# Patient Record
Sex: FEMALE | Race: BLACK OR AFRICAN AMERICAN | NOT HISPANIC OR LATINO | Employment: FULL TIME | URBAN - METROPOLITAN AREA
[De-identification: names, ages, dates, MRNs, and addresses within clinical notes are randomized per-mention and may not be internally consistent; named-entity substitution may affect disease eponyms.]

---

## 2017-01-12 ENCOUNTER — PATIENT MESSAGE (OUTPATIENT)
Dept: RHEUMATOLOGY | Facility: CLINIC | Age: 32
End: 2017-01-12

## 2017-01-12 ENCOUNTER — LAB VISIT (OUTPATIENT)
Dept: LAB | Facility: HOSPITAL | Age: 32
End: 2017-01-12
Attending: INTERNAL MEDICINE
Payer: COMMERCIAL

## 2017-01-12 DIAGNOSIS — N18.30 CHRONIC KIDNEY DISEASE (CKD), STAGE III (MODERATE): ICD-10-CM

## 2017-01-12 DIAGNOSIS — M32.9 SYSTEMIC LUPUS ERYTHEMATOSUS ARTHRITIS: ICD-10-CM

## 2017-01-12 DIAGNOSIS — M32.14 STAGE IV LUPUS NEPHRITIS (WHO): ICD-10-CM

## 2017-01-12 LAB
BACTERIA #/AREA URNS HPF: ABNORMAL /HPF
BILIRUB UR QL STRIP: NEGATIVE
CLARITY UR: CLEAR
COLOR UR: YELLOW
GLUCOSE UR QL STRIP: NEGATIVE
HGB UR QL STRIP: ABNORMAL
HYALINE CASTS #/AREA URNS LPF: 0 /LPF
KETONES UR QL STRIP: NEGATIVE
LEUKOCYTE ESTERASE UR QL STRIP: NEGATIVE
MICROSCOPIC COMMENT: ABNORMAL
NITRITE UR QL STRIP: NEGATIVE
PH UR STRIP: 6 [PH] (ref 5–8)
PROT UR QL STRIP: ABNORMAL
RBC #/AREA URNS HPF: 20 /HPF (ref 0–4)
SP GR UR STRIP: 1.02 (ref 1–1.03)
URN SPEC COLLECT METH UR: ABNORMAL
WBC #/AREA URNS HPF: 2 /HPF (ref 0–5)

## 2017-01-12 PROCEDURE — 84156 ASSAY OF PROTEIN URINE: CPT

## 2017-01-12 PROCEDURE — 81000 URINALYSIS NONAUTO W/SCOPE: CPT | Mod: PO

## 2017-01-13 LAB
CREAT UR-MCNC: 100 MG/DL
PROT UR-MCNC: 58 MG/DL
PROT/CREAT RATIO, UR: 0.58

## 2017-01-13 RX ORDER — HYDROXYCHLOROQUINE SULFATE 200 MG/1
200 TABLET, FILM COATED ORAL 2 TIMES DAILY
Qty: 180 TABLET | Refills: 3 | Status: SHIPPED | OUTPATIENT
Start: 2017-01-13 | End: 2017-09-06 | Stop reason: SDUPTHER

## 2017-01-16 ENCOUNTER — OFFICE VISIT (OUTPATIENT)
Dept: NEPHROLOGY | Facility: CLINIC | Age: 32
End: 2017-01-16
Payer: COMMERCIAL

## 2017-01-16 VITALS
BODY MASS INDEX: 47.66 KG/M2 | SYSTOLIC BLOOD PRESSURE: 124 MMHG | WEIGHT: 242.75 LBS | DIASTOLIC BLOOD PRESSURE: 86 MMHG | HEIGHT: 60 IN

## 2017-01-16 DIAGNOSIS — R31.9 HEMATURIA SYNDROME: ICD-10-CM

## 2017-01-16 DIAGNOSIS — M32.14 LUPUS NEPHRITIS: Primary | ICD-10-CM

## 2017-01-16 PROCEDURE — 3079F DIAST BP 80-89 MM HG: CPT | Mod: S$GLB,,, | Performed by: INTERNAL MEDICINE

## 2017-01-16 PROCEDURE — 3074F SYST BP LT 130 MM HG: CPT | Mod: S$GLB,,, | Performed by: INTERNAL MEDICINE

## 2017-01-16 PROCEDURE — 99999 PR PBB SHADOW E&M-EST. PATIENT-LVL III: CPT | Mod: PBBFAC,,, | Performed by: INTERNAL MEDICINE

## 2017-01-16 PROCEDURE — 1159F MED LIST DOCD IN RCRD: CPT | Mod: S$GLB,,, | Performed by: INTERNAL MEDICINE

## 2017-01-16 PROCEDURE — 99214 OFFICE O/P EST MOD 30 MIN: CPT | Mod: S$GLB,,, | Performed by: INTERNAL MEDICINE

## 2017-01-16 NOTE — MR AVS SNAPSHOT
[facility] Nephrology  [address] [place] [address]  Phone: [phone]  Fax: [phone]                  Marsha Marina   2017 11:30 AM   Office Visit    Description:  Female : 1985   Provider:  Hussain Avery MD   Department:  [facility] - Nephrology           Reason for Visit     Chronic Kidney Disease     Follow-up                To Do List           Future Appointments        Provider Department Dept Phone    2017 10:10 AM LAB[facility]ATORY, [facility] Laboratory [phone]    2017 10:20 AM SPECIMEN, [facility] - Specimen Laboratory [phone]    2017 10:10 AM LAB[facility]ORY, [facility] Laboratory [phone]    2017 10:20 AM SPE[facility]IMEN, [facility] - Specimen Laboratory [phone]    2017 10:10 AM LAB[facility]ATORY, [facility] [phone]      Goals (5 Years of Data)     None      Follow-Up and Disposition     Return in about 3 months (around 2017).      [facility] On Call     [facility] On Call Nurse Care Line - 24 Assistance  Registered nurses in the [facility] On Call Center provide clinical advisement, health education, appointment booking, and other advisory services.  Call for this free service at [phone].             Medications           Message regarding Medications     Verify the changes and/or additions to your medication regime listed below are the same as discussed with your clinician today.  If any of these changes or additions are incorrect, please notify your healthcare provider.             Verify that the below list of medications is an accurate representation of the medications you are currently taking.  If none reported, the list may be blank. If incorrect, please contact your healthcare provider. Carry this list with you in case of emergency.           Current Medications     diltiaZEM (CARDIZEM CD) 360 MG 24 hr capsule Take 1 capsule (360 mg total) by mouth once daily.    efinaconazole 10 % Tere  Apply 1 application topically once daily.    ergocalciferol (ERGOCALCIFEROL) 50,000 unit Cap Take 1 capsule (50,000 Units total) by mouth every 7 days.    furosemide (LASIX) 40 MG tablet Take 1 tablet (40 mg total) by mouth 2 (two) times daily.    hydrALAZINE (APRESOLINE) 50 MG tablet Take 1 tablet (50 mg total) by mouth every 12 (twelve) hours.    hydroxychloroquine (PLAQUENIL) 200 mg tablet Take 1 tablet (200 mg total) by mouth 2 (two) times daily.    labetalol (NORMODYNE) 200 MG tablet Take 1 tablet (200 mg total) by mouth every 12 (twelve) hours.    melatonin 5 mg Tab Take by mouth.    OLIVE LEAF EXTRACT ORAL Take by mouth.    pantoprazole (PROTONIX) 40 MG tablet Take 40 mg by mouth once daily.     PROAIR HFA 90 mcg/actuation inhaler Inhale 2 puffs into the lungs as needed.    promethazine (PHENERGAN) 12.5 MG Tab Take 1 tablet (12.5 mg total) by mouth 2 (two) times daily as needed.    valacyclovir (VALTREX) 500 MG tablet Take 1 tablet (500 mg total) by mouth once daily.    mycophenolate (CELLCEPT) 500 mg Tab Take 2 tablets (1,000 mg total) by mouth 3 (three) times daily.           Clinical Reference Information           Vital Signs - Last Recorded  Most recent update: 1/16/2017 12:06 PM by Delaney Adam LPN    BP Ht Wt BMI       124/86 5' (1.524 m) 110.1 kg (242 lb 11.6 oz) 47.4 kg/m2       Blood Pressure          Most Recent Value    BP  124/86      Allergies as of 1/16/2017     Aspartame    Codeine    Morphine      Immunizations Administered on Date of Encounter - 1/16/2017     None      Instructions    Avoid NSAID pain medications such as advil, aleve, motrin, ibuprofen, naprosyn, meloxicam, diclofenac, mobic.

## 2017-01-16 NOTE — PROGRESS NOTES
Subjective:       Patient ID: Marsha Marina is a 31 y.o.   female who presents for follow-up evaluation of  Lupus Nephrtis, HTN, edema , CKD stage 2/ 3       Sonia Castorena DO      HPI: Marsha Marina is a pleasant 31-year-old -American woman seen in office today in follow-up for above medical problems. I saw her in clinic about 4 months ago.  Recent lab results were discussed with the patient.  Renal function is stable with a serum creatinine of 1 mg/dL.  She has mild proteinuria at 0.5 g per day.  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 Cardona.      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 About 5-10% of interstitial fibrosis was reported on this biopsy.           Past Medical History   Diagnosis Date    Abnormal Pap smear of vagina      10 years ago//had colpo with normal results//    Asthma     Genital herpes     Hypertension     SLE (systemic lupus erythematosus)          Current Outpatient Prescriptions on File Prior to Visit   Medication Sig Dispense Refill    diltiaZEM (CARDIZEM CD) 360 MG 24 hr capsule Take 1 capsule (360 mg total) by mouth once daily. 30 capsule 3    efinaconazole 10 % Tere Apply 1 application topically once daily. 8 mL 11    ergocalciferol (ERGOCALCIFEROL) 50,000 unit Cap Take 1 capsule (50,000 Units total) by mouth every 7 days. 12 capsule 3    furosemide (LASIX) 40 MG tablet Take 1 tablet (40 mg total) by mouth 2 (two) times daily. (Patient taking differently: Take 40 mg by mouth once daily. ) 60 tablet 11    hydrALAZINE (APRESOLINE) 50 MG tablet Take 1 tablet (50 mg total) by mouth every 12 (twelve)  hours. 60 tablet 8    hydroxychloroquine (PLAQUENIL) 200 mg tablet Take 1 tablet (200 mg total) by mouth 2 (two) times daily. 180 tablet 3    labetalol (NORMODYNE) 200 MG tablet Take 1 tablet (200 mg total) by mouth every 12 (twelve) hours. 60 tablet 6    melatonin 5 mg Tab Take by mouth.      OLIVE LEAF EXTRACT ORAL Take by mouth.      pantoprazole (PROTONIX) 40 MG tablet Take 40 mg by mouth once daily.   1    PROAIR HFA 90 mcg/actuation inhaler Inhale 2 puffs into the lungs as needed. 18 g 0    promethazine (PHENERGAN) 12.5 MG Tab Take 1 tablet (12.5 mg total) by mouth 2 (two) times daily as needed. 40 tablet 4    valacyclovir (VALTREX) 500 MG tablet Take 1 tablet (500 mg total) by mouth once daily. 30 tablet 11    mycophenolate (CELLCEPT) 500 mg Tab Take 2 tablets (1,000 mg total) by mouth 3 (three) times daily. 180 tablet 6     No current facility-administered medications on file prior to visit.          Review of Systems  :      Constitutional: Negative for activity change, appetite change and fever.   HENT: Negative for congestion, facial swelling, sore throat, trouble swallowing and voice change.   Eyes: Negative for redness and visual disturbance.   Respiratory: Negative for apnea, cough, chest tightness, shortness of breath and wheezing.   Cardiovascular: Negative for chest pain, palpitations and leg swelling.   Gastrointestinal: Negative for abdominal distention, abdominal pain, blood in stool, constipation, diarrhea and vomiting.   Genitourinary: Negative for decreased urine volume, difficulty urinating, dysuria, flank pain, frequency, hematuria, pelvic pain and urgency.   Musculoskeletal: Negative for back pain, gait problem and joint swelling.   Skin: Negative for color change and rash.   Neurological: Negative for dizziness, syncope, weakness and headaches.   Hematological: Does not bruise/bleed easily.   Psychiatric/Behavioral: Negative for agitation, behavioral problems and confusion. The  patient is not nervous/anxious.         Objective:         Vitals:    01/16/17 1204   BP: 124/86       Pulse 80, respiratory rate 20, afebrile, weight 242 pounds, previous weight was 245 pounds    Physical Exam  :      Constitutional: She is oriented to person, place, and time. She appears well-developed and well-nourished. No distress.    HENT:  Head: Normocephalic and atraumatic. Oropharynx is clear and moist. No oropharyngeal exudate.    Eyes: Conjunctivae and EOM are normal. Pupils are equal, round, and reactive to light. No scleral icterus.    Neck: Normal range of motion. Neck supple. No JVD present. Carotid bruit is not present. No tracheal deviation present. No thyroid mass and no thyromegaly present.    Cardiovascular: Normal rate, regular rhythm, normal heart sounds and intact distal pulses. Exam reveals no gallop and no friction rub.    No murmur heard.  Pulmonary/Chest: Effort normal and breath sounds normal. No respiratory distress. She has no wheezes. She has no rales. She exhibits no tenderness.    Abdominal: Soft. Bowel sounds are normal. She exhibits no distension, no abdominal bruit, no ascites and no mass. There is no hepatosplenomegaly. There is no tenderness. There is no rebound, no guarding and no CVA tenderness. Obese    Musculoskeletal: Normal range of motion. She exhibits no edema. She exhibits no tenderness.   Lymphadenopathy: She has no cervical adenopathy.   Neurological: She is alert and oriented to person, place, and time. She has normal reflexes. No cranial nerve deficit. She exhibits normal muscle tone. Coordination normal.    Skin: Skin is warm and intact. No rash noted. No erythema. No pallor.   Psychiatric: She has a normal mood and affect. Her behavior is normal. Judgment normal.            Labs:    Lab Results   Component Value Date    CREATININE 1.0 01/12/2017    BUN 24 (H) 01/12/2017     01/12/2017    K 4.0 01/12/2017     01/12/2017    CO2 22 (L) 01/12/2017        Lab Results   Component Value Date    WBC 6.19 01/12/2017    HGB 12.8 01/12/2017    HCT 38.5 01/12/2017    MCV 91 01/12/2017     01/12/2017       Lab Results   Component Value Date    PTH 59.0 06/27/2016    CALCIUM 9.1 01/12/2017    PHOS 3.4 01/12/2017       Lab Results   Component Value Date    ALBUMIN 3.7 01/12/2017     Lab Results   Component Value Date    URICACID 5.1 01/12/2017         Impression and Plan : 31 Y Old AA woman seen in f/u for following medical problems,        1. Lupus Nephritis : Renal function is stable with a serum creatinine of 1 mg/dL, she is currently holding her CellCept for upcoming gastric sleeve surgery, by  Dr. Cardona ,  will check labs weekly for the next 6-8 weeks to look for any lupus flares while she is holding her CellCept.    Diagnosed with class IV and class V lupus nephritis. Only 5-10% of interstitial fibrosis reported on the renal biopsy, was on CellCept 1000 mg TID , Plaquenil 200 mg daily.       2. HTN : BP controlled      3. Volume : edema resolved       4. metabolic acidosis : cont sodium bicarb ,        5. Secondary hyperparathyroidism - cont  vitamin D 2,      6. Anemia : stable Hb , follow       7. Hyperuricemia - controlled,       8. Proteinuria - most recent urinalysis shows a proteinuria of about 0.5 g per day, she is not on ACE inhibitor/ARBs due to fluctuating serum creatinine and episodes of acute renal failure.     9.  Microscopic hematuria - can be related to her lupus, patient never had a cystoscopy to rule out any bladder lesions, will schedule an evaluation by urology for the same.      Follow-up in 3 months, discussed plan with patient . More than 30 minutes of face-to-face time discussing labs and plan of care.        Hussain Avery MD

## 2017-01-19 ENCOUNTER — DOCUMENTATION ONLY (OUTPATIENT)
Dept: NEPHROLOGY | Facility: CLINIC | Age: 32
End: 2017-01-19

## 2017-01-19 ENCOUNTER — PATIENT MESSAGE (OUTPATIENT)
Dept: NEPHROLOGY | Facility: CLINIC | Age: 32
End: 2017-01-19

## 2017-01-19 NOTE — PROGRESS NOTES
Discussed with Dr Cardona , pt will be holding Cellcept for a  total of 4 weeks , 2 weeks before and 2 weeks after her gastric sleeve surgery.    Dr Avery

## 2017-02-01 ENCOUNTER — LAB VISIT (OUTPATIENT)
Dept: LAB | Facility: HOSPITAL | Age: 32
End: 2017-02-01
Attending: INTERNAL MEDICINE
Payer: COMMERCIAL

## 2017-02-01 DIAGNOSIS — M32.14 LUPUS NEPHRITIS: ICD-10-CM

## 2017-02-01 LAB
ALBUMIN SERPL BCP-MCNC: 3.4 G/DL
ANION GAP SERPL CALC-SCNC: 5 MMOL/L
BUN SERPL-MCNC: 16 MG/DL
CALCIUM SERPL-MCNC: 9.1 MG/DL
CHLORIDE SERPL-SCNC: 110 MMOL/L
CO2 SERPL-SCNC: 25 MMOL/L
CREAT SERPL-MCNC: 0.9 MG/DL
EST. GFR  (AFRICAN AMERICAN): >60 ML/MIN/1.73 M^2
EST. GFR  (NON AFRICAN AMERICAN): >60 ML/MIN/1.73 M^2
GLUCOSE SERPL-MCNC: 82 MG/DL
PHOSPHATE SERPL-MCNC: 3.4 MG/DL
POTASSIUM SERPL-SCNC: 4 MMOL/L
SODIUM SERPL-SCNC: 140 MMOL/L

## 2017-02-01 PROCEDURE — 80069 RENAL FUNCTION PANEL: CPT

## 2017-02-01 PROCEDURE — 36415 COLL VENOUS BLD VENIPUNCTURE: CPT | Mod: PO

## 2017-02-06 ENCOUNTER — PATIENT MESSAGE (OUTPATIENT)
Dept: NEPHROLOGY | Facility: CLINIC | Age: 32
End: 2017-02-06

## 2017-02-08 ENCOUNTER — LAB VISIT (OUTPATIENT)
Dept: LAB | Facility: HOSPITAL | Age: 32
End: 2017-02-08
Attending: INTERNAL MEDICINE
Payer: COMMERCIAL

## 2017-02-08 DIAGNOSIS — M32.14 LUPUS NEPHRITIS: ICD-10-CM

## 2017-02-08 DIAGNOSIS — M32.14 LUPUS NEPHRITIS: Primary | ICD-10-CM

## 2017-02-08 LAB
ALBUMIN SERPL BCP-MCNC: 3.6 G/DL
ANION GAP SERPL CALC-SCNC: 8 MMOL/L
BASOPHILS # BLD AUTO: 0.03 K/UL
BASOPHILS NFR BLD: 0.5 %
BUN SERPL-MCNC: 17 MG/DL
CALCIUM SERPL-MCNC: 9.1 MG/DL
CHLORIDE SERPL-SCNC: 110 MMOL/L
CO2 SERPL-SCNC: 21 MMOL/L
CREAT SERPL-MCNC: 1 MG/DL
DIFFERENTIAL METHOD: NORMAL
EOSINOPHIL # BLD AUTO: 0.2 K/UL
EOSINOPHIL NFR BLD: 3.4 %
ERYTHROCYTE [DISTWIDTH] IN BLOOD BY AUTOMATED COUNT: 13.6 %
EST. GFR  (AFRICAN AMERICAN): >60 ML/MIN/1.73 M^2
EST. GFR  (NON AFRICAN AMERICAN): >60 ML/MIN/1.73 M^2
GLUCOSE SERPL-MCNC: 71 MG/DL
HCT VFR BLD AUTO: 37 %
HGB BLD-MCNC: 12.3 G/DL
LYMPHOCYTES # BLD AUTO: 2.3 K/UL
LYMPHOCYTES NFR BLD: 41.5 %
MCH RBC QN AUTO: 29.9 PG
MCHC RBC AUTO-ENTMCNC: 33.2 %
MCV RBC AUTO: 90 FL
MONOCYTES # BLD AUTO: 0.5 K/UL
MONOCYTES NFR BLD: 9.4 %
NEUTROPHILS # BLD AUTO: 2.5 K/UL
NEUTROPHILS NFR BLD: 45 %
PHOSPHATE SERPL-MCNC: 3.7 MG/DL
PLATELET # BLD AUTO: 198 K/UL
PMV BLD AUTO: 11.8 FL
POTASSIUM SERPL-SCNC: 4.3 MMOL/L
RBC # BLD AUTO: 4.11 M/UL
SODIUM SERPL-SCNC: 139 MMOL/L
WBC # BLD AUTO: 5.62 K/UL

## 2017-02-08 PROCEDURE — 80069 RENAL FUNCTION PANEL: CPT

## 2017-02-08 PROCEDURE — 36415 COLL VENOUS BLD VENIPUNCTURE: CPT | Mod: PO

## 2017-02-08 PROCEDURE — 85025 COMPLETE CBC W/AUTO DIFF WBC: CPT

## 2017-02-09 ENCOUNTER — OFFICE VISIT (OUTPATIENT)
Dept: RHEUMATOLOGY | Facility: CLINIC | Age: 32
End: 2017-02-09
Payer: COMMERCIAL

## 2017-02-09 ENCOUNTER — PATIENT MESSAGE (OUTPATIENT)
Dept: NEPHROLOGY | Facility: CLINIC | Age: 32
End: 2017-02-09

## 2017-02-09 DIAGNOSIS — R31.9 HEMATURIA SYNDROME: Primary | ICD-10-CM

## 2017-02-09 DIAGNOSIS — M32.14 STAGE IV LUPUS NEPHRITIS (WHO): Primary | ICD-10-CM

## 2017-02-09 DIAGNOSIS — E66.01 MORBID OBESITY WITH BMI OF 40.0-44.9, ADULT: ICD-10-CM

## 2017-02-09 DIAGNOSIS — M32.9 SYSTEMIC LUPUS ERYTHEMATOSUS ARTHRITIS: ICD-10-CM

## 2017-02-09 PROCEDURE — 99999 PR PBB SHADOW E&M-EST. PATIENT-LVL II: CPT | Mod: PBBFAC,,, | Performed by: INTERNAL MEDICINE

## 2017-02-09 PROCEDURE — 99214 OFFICE O/P EST MOD 30 MIN: CPT | Mod: S$GLB,,, | Performed by: INTERNAL MEDICINE

## 2017-02-09 NOTE — ASSESSMENT & PLAN NOTE
Stable lupus nephritis with stable Urine P/C ratio, normal serum creatinine under treatment by Nehrologist . She was requesting to reduce the cellcept since she has nausea already from her gastric bypass and is afraid that it might get worse by taking 1000 mg three times daily. Based on her improvement of systemic symptoms , I advised that the dose reduction should be decided by  and I would agree with 1000 mg bid as long as he is ok with it. Discuss with Dr. Sutherland on the dose.

## 2017-02-09 NOTE — MR AVS SNAPSHOT
[facility] - Rheumatology  [address] [place] [address]  Phone: [phone]  Fax: [phone]                  Marsha Marina   2017 4:30 PM   Office Visit    Description:  Female : 1985   Provider:  Freddy Maza MD   Department:  [facility] - Rheumatology           Reason for Visit     Disease Management           Diagnoses this Visit        Comments    Stage IV lupus nephritis (WHO)    -  Primary     Systemic lupus erythematosus arthritis         Morbid obesity with BMI of 40.0-44.9, adult                To Do List           Future Appointments        Provider Department Dept Phone    2/15/2017 10:10 AM LAB[facility]ATORY, [facility] [phone]    2/15/2017 10:20 AM SPECIMEN, [facility] - Specimen Laboratory [phone]    2017 10:10 AM LAB[facility]ATORY, [facility] Laboratory [phone]    2017 10:20 AM SPECIMEN, [facility] - Specimen Laboratory [phone]    3/1/2017 10:40 AM LAB[facility]ATORY, [facility] Laboratory [phone]      Goals (5 Years of Data)     None      Follow-Up and Disposition     Return in about 3 months (around 2017).      [facility] On Call     [facility] On Call Nurse Care Line -  Assistance  Registered nurses in the [facility] On Call Center provide clinical advisement, health education, appointment booking, and other advisory services.  Call for this free service at [phone].             Medications           Message regarding Medications     Verify the changes and/or additions to your medication regime listed below are the same as discussed with your clinician today.  If any of these changes or additions are incorrect, please notify your healthcare provider.             Verify that the below list of medications is an accurate representation of the medications you are currently taking.  If none reported, the list may be blank. If incorrect, please contact your healthcare provider. Carry this list with you  in case of emergency.           Current Medications     diltiaZEM (CARDIZEM CD) 360 MG 24 hr capsule Take 1 capsule (360 mg total) by mouth once daily.    efinaconazole 10 % Tere Apply 1 application topically once daily.    ergocalciferol (ERGOCALCIFEROL) 50,000 unit Cap Take 1 capsule (50,000 Units total) by mouth every 7 days.    furosemide (LASIX) 40 MG tablet Take 1 tablet (40 mg total) by mouth 2 (two) times daily.    hydrALAZINE (APRESOLINE) 50 MG tablet Take 1 tablet (50 mg total) by mouth every 12 (twelve) hours.    hydroxychloroquine (PLAQUENIL) 200 mg tablet Take 1 tablet (200 mg total) by mouth 2 (two) times daily.    labetalol (NORMODYNE) 200 MG tablet Take 1 tablet (200 mg total) by mouth every 12 (twelve) hours.    melatonin 5 mg Tab Take by mouth.    mycophenolate (CELLCEPT) 500 mg Tab Take 2 tablets (1,000 mg total) by mouth 3 (three) times daily.    OLIVE LEAF EXTRACT ORAL Take by mouth.    pantoprazole (PROTONIX) 40 MG tablet Take 40 mg by mouth once daily.     PROAIR HFA 90 mcg/actuation inhaler Inhale 2 puffs into the lungs as needed.    promethazine (PHENERGAN) 12.5 MG Tab Take 1 tablet (12.5 mg total) by mouth 2 (two) times daily as needed.    valacyclovir (VALTREX) 500 MG tablet Take 1 tablet (500 mg total) by mouth once daily.           Clinical Reference Information           Allergies as of 2/9/2017     Aspartame    Codeine    Morphine      Immunizations Administered on Date of Encounter - 2/9/2017     None      Language Assistance Services     ATTENTION: Language assistance services are available, free of charge. Please call [phone].      ATENCIÓN: Si habla español, tiene a cueto disposición servicios gratuitos de asistencia lingüística. Mildred al [phone].     CHÚ Ý: N?u b?n nói Ti?ng Vi?t, có các d?ch v? h? tr? ngôn ng? mi?n phí dành cho b?n. G?i s? [phone].         [facility] - Rheumatology complies with applicable Federal civil rights laws and does not discriminate on the  basis of race, color, national origin, age, disability, or sex.

## 2017-02-15 ENCOUNTER — PATIENT MESSAGE (OUTPATIENT)
Dept: NEPHROLOGY | Facility: CLINIC | Age: 32
End: 2017-02-15

## 2017-02-15 NOTE — TELEPHONE ENCOUNTER
You can stop Labetolol and monitor your pulse and BP,     Target Pulse less than 100 and BP less than 140/90     I will make an appt with Urology , no need for weekly labs     Dr Avery

## 2017-03-20 RX ORDER — DILTIAZEM HYDROCHLORIDE 360 MG/1
CAPSULE, EXTENDED RELEASE ORAL
Qty: 30 CAPSULE | Refills: 0 | Status: SHIPPED | OUTPATIENT
Start: 2017-03-20 | End: 2017-04-25 | Stop reason: SDUPTHER

## 2017-03-31 ENCOUNTER — OFFICE VISIT (OUTPATIENT)
Dept: RHEUMATOLOGY | Facility: CLINIC | Age: 32
End: 2017-03-31
Payer: COMMERCIAL

## 2017-03-31 ENCOUNTER — LAB VISIT (OUTPATIENT)
Dept: LAB | Facility: HOSPITAL | Age: 32
End: 2017-03-31
Attending: INTERNAL MEDICINE
Payer: COMMERCIAL

## 2017-03-31 VITALS
BODY MASS INDEX: 42.28 KG/M2 | DIASTOLIC BLOOD PRESSURE: 89 MMHG | SYSTOLIC BLOOD PRESSURE: 135 MMHG | HEART RATE: 83 BPM | WEIGHT: 216.5 LBS

## 2017-03-31 DIAGNOSIS — M32.9 SYSTEMIC LUPUS ERYTHEMATOSUS ARTHRITIS: Primary | ICD-10-CM

## 2017-03-31 DIAGNOSIS — D84.9 IMMUNOSUPPRESSION: ICD-10-CM

## 2017-03-31 DIAGNOSIS — E66.01 MORBID OBESITY WITH BMI OF 40.0-44.9, ADULT: ICD-10-CM

## 2017-03-31 DIAGNOSIS — M32.14 STAGE IV LUPUS NEPHRITIS (WHO): ICD-10-CM

## 2017-03-31 DIAGNOSIS — M32.9 SYSTEMIC LUPUS ERYTHEMATOSUS ARTHRITIS: ICD-10-CM

## 2017-03-31 LAB
BACTERIA #/AREA URNS HPF: ABNORMAL /HPF
BILIRUB UR QL STRIP: NEGATIVE
CLARITY UR: CLEAR
COLOR UR: YELLOW
CREAT UR-MCNC: 132 MG/DL
GLUCOSE UR QL STRIP: NEGATIVE
HGB UR QL STRIP: ABNORMAL
HYALINE CASTS #/AREA URNS LPF: 0 /LPF
KETONES UR QL STRIP: NEGATIVE
LEUKOCYTE ESTERASE UR QL STRIP: NEGATIVE
MICROSCOPIC COMMENT: ABNORMAL
NITRITE UR QL STRIP: NEGATIVE
PH UR STRIP: 6 [PH] (ref 5–8)
PROT UR QL STRIP: ABNORMAL
PROT UR-MCNC: 45 MG/DL
PROT/CREAT RATIO, UR: 0.34
RBC #/AREA URNS HPF: 30 /HPF (ref 0–4)
SP GR UR STRIP: >=1.03 (ref 1–1.03)
SQUAMOUS #/AREA URNS HPF: 5 /HPF
URN SPEC COLLECT METH UR: ABNORMAL
WBC #/AREA URNS HPF: 2 /HPF (ref 0–5)
YEAST URNS QL MICRO: ABNORMAL

## 2017-03-31 PROCEDURE — 90715 TDAP VACCINE 7 YRS/> IM: CPT | Mod: S$GLB,,, | Performed by: INTERNAL MEDICINE

## 2017-03-31 PROCEDURE — 1160F RVW MEDS BY RX/DR IN RCRD: CPT | Mod: S$GLB,,, | Performed by: INTERNAL MEDICINE

## 2017-03-31 PROCEDURE — 3079F DIAST BP 80-89 MM HG: CPT | Mod: S$GLB,,, | Performed by: INTERNAL MEDICINE

## 2017-03-31 PROCEDURE — 90670 PCV13 VACCINE IM: CPT | Mod: S$GLB,,, | Performed by: INTERNAL MEDICINE

## 2017-03-31 PROCEDURE — 81000 URINALYSIS NONAUTO W/SCOPE: CPT | Mod: PO

## 2017-03-31 PROCEDURE — 90471 IMMUNIZATION ADMIN: CPT | Mod: S$GLB,,, | Performed by: INTERNAL MEDICINE

## 2017-03-31 PROCEDURE — 99999 PR PBB SHADOW E&M-EST. PATIENT-LVL III: CPT | Mod: PBBFAC,,, | Performed by: INTERNAL MEDICINE

## 2017-03-31 PROCEDURE — 99214 OFFICE O/P EST MOD 30 MIN: CPT | Mod: 25,S$GLB,, | Performed by: INTERNAL MEDICINE

## 2017-03-31 PROCEDURE — 90472 IMMUNIZATION ADMIN EACH ADD: CPT | Mod: S$GLB,,, | Performed by: INTERNAL MEDICINE

## 2017-03-31 PROCEDURE — 3075F SYST BP GE 130 - 139MM HG: CPT | Mod: S$GLB,,, | Performed by: INTERNAL MEDICINE

## 2017-03-31 PROCEDURE — 82570 ASSAY OF URINE CREATININE: CPT

## 2017-03-31 RX ORDER — SODIUM CHLORIDE 0.9 % (FLUSH) 0.9 %
10 SYRINGE (ML) INJECTION
Status: CANCELLED | OUTPATIENT
Start: 2017-04-03

## 2017-03-31 RX ORDER — ACETAMINOPHEN 325 MG/1
650 TABLET ORAL
Status: CANCELLED | OUTPATIENT
Start: 2017-04-03

## 2017-03-31 NOTE — ASSESSMENT & PLAN NOTE
Stable lupus nephritis with stable Urine P/C ratio, normal serum creatinine under treatment by Nehrologist . Serologies and activity labs today. The nausea is less likely to be due to cellcept and more likely to be secondary to gastric bypass.

## 2017-03-31 NOTE — MR AVS SNAPSHOT
[facility]umatology  [address] [facility] [place] [address]  Phone: [phone]  Fax: [phone]                  Marsha Marina   3/31/2017 1:00 PM   Office Visit    Description:  Female : 1985   Provider:  Freddy Maza MD   Department:  [facility] Rheumatology           Reason for Visit     Disease Management           Diagnoses this Visit        Comments    Systemic lupus erythematosus arthritis    -  Primary     Stage IV lupus nephritis (WHO)         Morbid obesity with BMI of 40.0-44.9, adult         Immunosuppression                To Do List           Future Appointments        Provider Department Dept Phone    2017 10:30 AM LABORATORY, [facility] [phone]    2017 10:40 AM SPECIMEN, [facility] Specimen Laboratory [phone]    2017 11:00 AM Hussain Avery MD [facility] Nephrology [phone]    2017 4:30 PM Freddy Maza MD [facility] Rheumatology [phone]    2017 5:00 PM Kosta Neff IV, MD [facility] - Urology [phone]      Goals (5 Years of Data)     None      Follow-Up and Disposition     Return in about 3 months (around 2017).      [facility]     [facility] -  Assistance  Unless otherwise directed by your provider, please contact [facility], our nurse care line that is available for  assistance.     Registered nurses in the [facility] provide: appointment scheduling, clinical advisement, health education, and other advisory services.  Call: [phone] (toll free)               Medications           Message regarding Medications     Verify the changes and/or additions to your medication regime listed below are the same as discussed with your clinician today.  If any of these changes or additions are incorrect, please notify your healthcare provider.             Verify that the below list of medications is an accurate representation of the  medications you are currently taking.  If none reported, the list may be blank. If incorrect, please contact your healthcare provider. Carry this list with you in case of emergency.           Current Medications     diltiaZEM (CARDIZEM CD) 360 MG 24 hr capsule TAKE 1 CAPSULE(360 MG) BY MOUTH EVERY DAY    efinaconazole 10 % Tere Apply 1 application topically once daily.    ergocalciferol (ERGOCALCIFEROL) 50,000 unit Cap Take 1 capsule (50,000 Units total) by mouth every 7 days.    furosemide (LASIX) 40 MG tablet Take 1 tablet (40 mg total) by mouth 2 (two) times daily.    hydroxychloroquine (PLAQUENIL) 200 mg tablet Take 1 tablet (200 mg total) by mouth 2 (two) times daily.    labetalol (NORMODYNE) 200 MG tablet Take 1 tablet (200 mg total) by mouth every 12 (twelve) hours.    melatonin 5 mg Tab Take by mouth.    mycophenolate (CELLCEPT) 500 mg Tab Take 2 tablets (1,000 mg total) by mouth 3 (three) times daily.    OLIVE LEAF EXTRACT ORAL Take by mouth.    pantoprazole (PROTONIX) 40 MG tablet Take 40 mg by mouth once daily.     PROAIR HFA 90 mcg/actuation inhaler Inhale 2 puffs into the lungs as needed.    promethazine (PHENERGAN) 12.5 MG Tab Take 1 tablet (12.5 mg total) by mouth 2 (two) times daily as needed.    valacyclovir (VALTREX) 500 MG tablet Take 1 tablet (500 mg total) by mouth once daily.           Clinical Reference Information           Your Vitals Were     BP Pulse Weight BMI       135/89 83 98.2 kg (216 lb 7.9 oz) 42.28 kg/m2       Blood Pressure          Most Recent Value    BP  135/89      Allergies as of 3/31/2017     Aspartame    Codeine    Morphine      Immunizations Administered on Date of Encounter - 3/31/2017     Name Date Dose VIS Date Route    Pneumococcal Conjugate - 13 Valent  Incomplete 0.5 mL 11/5/2015 Intramuscular    TDAP  Incomplete 0.5 mL 2/24/2015 Intramuscular      Orders Placed During Today's Visit      Normal Orders This Visit    Pneumococcal Conjugate Vaccine (13 Valent) (IM)      Tdap Vaccine     Future Labs/Procedures Expected by Expires    Anti-DNA antibody, double-stranded  3/31/2017 3/31/2018    C-reactive protein  3/31/2017 3/31/2018    C3 complement  3/31/2017 3/31/2018    C4 complement  3/31/2017 3/31/2018    CBC auto differential  3/31/2017 3/31/2018    Comprehensive metabolic panel  3/31/2017 3/31/2018    Sedimentation rate, manual  3/31/2017 3/31/2018    Protein / creatinine ratio, urine  As directed 3/31/2018    Urinalysis  As directed 5/30/2018      Instructions      Belimumab solution for injection  What is this medicine?  BELIMUMAB (be GASPAR ue mab) is a medicine used to treat a certain type of systemic lupus erythematosus (SLE). This medicine is used with standard therapy for SLE. It is not a cure.  How should I use this medicine?  This medicine is for infusion into a vein. It is given by a health care professional in a hospital or clinic setting.  A special MedGuide will be given to you by the pharmacist with each prescription and refill. Be sure to read this information carefully each time.  Talk to your pediatrician regarding the use of this medicine in children. Special care may be needed.  What side effects may I notice from receiving this medicine?  Side effects that you should report to your doctor or health care professional as soon as possible:  · allergic reactions like skin rash, itching or hives, swelling of the face, lips, or tongue  · depressed mood  · signs of infection - fever or chills, cough, sore throat, pain or difficulty passing urine  · suicidal thoughts or other mood changes  · unusually slow heartbeat  Side effects that usually do not require medical attention (Report these to your doctor or health care professional if they continue or are bothersome.):  · diarrhea  · headache  · muscle aches  · nausea, vomiting  · trouble sleeping  What may interact with this medicine?  Do not take this medicine with any of the following medications:  · live virus  vaccines  This medicine may also interact with the following medications:  · cyclophosphamide  · ofatumumab  · rituximab  What if I miss a dose?  It is important not to miss your dose. Call your doctor or health care professional if you are unable to keep an appointment.  Where should I keep my medicine?  This drug is given in a hospital or clinic and will not be stored at home.  What should I tell my health care provider before I take this medicine?  They need to know if you have any of these conditions:  · heart disease  · immune system problems  · infection (especially a virus infection such as chickenpox, cold sores, or herpes)  · mental illness  · suicidal thoughts, plans, or attempt; a previous suicide attempt by you or a family member  · an unusual or allergic reaction to belimumab, other medicines, foods, dyes, or preservatives  · pregnant or trying to get pregnant  · breast-feeding  What should I watch for while using this medicine?  Tell your doctor or healthcare professional if your symptoms do not start to get better or if they get worse.  Your condition will be monitored carefully while you are receiving this medicine.  Date Last Reviewed:   NOTE:This sheet is a summary. It may not cover all possible information. If you have questions about this medicine, talk to your doctor, pharmacist, or health care provider. Copyright© 2016 [employer]             Language Assistance Services     ATTENTION: Language assistance services are available, free of charge. Please call [phone].      ATENCIÓN: Si amanla julio, tiene a cueto disposición servicios gratuitos de asistencia lingüística. Llame al [phone].     ASHIA Ý: N?u b?n nói Ti?ng Vi?t, có các d?ch v? h? tr? ngôn ng? mi?n phí dành cho b?n. G?i s? [phone].         [facility] complies with applicable Federal civil rights laws and does not discriminate on the basis of race, color, national origin, age, disability, or sex.

## 2017-03-31 NOTE — ASSESSMENT & PLAN NOTE
Mild synovitis over few MCP's and PIP's without any large joint involvement. Consider benlysta. Discussed all adverse effects of the medication. Medication literature given on discharge.

## 2017-03-31 NOTE — PROGRESS NOTES
Administered 0.5 ml Tdap to right Deltoid. Pt tolerated well. No acute reaction noted to sire. Pt instructed on S/S to report. Pt. Verbalized  Understanding.     Lot: [id number]   Exp: 01/28/19        Administered 0.5 cc pneumococcal conjugate Vaccination to left Deltoid. Pt tolerated well. No acute reaction noted to site. Pt instructed on S/S to report. Pt verbalized understanding.     Lot: [id number]  Exp: 04/18

## 2017-03-31 NOTE — PROGRESS NOTES
RHEUMATOLOGY CLINIC FOLLOW UP VISIT  Chief complaints:-  My joints hurt. I have been nauseous every time I can the tablets.      HPI:-  Marsha Serrano a 31 y.o. pleasant female comes in for a follow up visit with above chief complaints. She has complex PMH including hypertensive encephalopathy and lupus nephritis. She is on 2 g of CellCept a day with Plaquenil.  Today she reports having mild recurrence of the pain over her small joints and legs for the past month. She also complains of having nausea to both cellcept and her anti HTN medications lasting for 30 minutes after taking them.     Physical examination:-    Vitals:    03/31/17 1326   BP: 135/89   Pulse: 83   Weight: 98.2 kg (216 lb 7.9 oz)   PainSc:   4   PainLoc: Generalized       Physical Exam   Constitutional: She is oriented to person, place, and time and well-developed, well-nourished, and in no distress. No distress.   HENT:   Head: Normocephalic.   Mouth/Throat: Oropharynx is clear and moist.   Eyes: Conjunctivae and EOM are normal. Pupils are equal, round, and reactive to light. Right eye exhibits no discharge. Left eye exhibits no discharge. No scleral icterus.   Neck: Normal range of motion. Neck supple.   Cardiovascular: Normal rate and intact distal pulses.    Pulmonary/Chest: Effort normal. No respiratory distress.   Abdominal: Soft. There is no tenderness.   Musculoskeletal:   Mild synovitis over second and third bilateral MCP's, 2 PIP's. DIP's normal .   Bilateral wrists nontender.  No effusion. No sclerodactyly or telangiectasias. No malar rash. Normal nail fold capillaries.    Lymphadenopathy:     She has no cervical adenopathy.   Neurological: She is alert and oriented to person, place, and time. No cranial nerve deficit.   Skin: Skin is warm. No rash noted. She is not diaphoretic. No erythema.   Psychiatric: Mood, affect and judgment normal.   Nursing note and vitals reviewed.      Labs:-  Results for MARSHA ROMEO  (MRN [id number]) as of 3/31/2017 16:04   Ref. Range 4/11/2016 14:35 5/9/2016 16:25 8/18/2016 15:00 11/9/2016 10:46   DEEP HEP-2 Titer Unknown Positive 1:640 Ho...      Anti-SSA Antibody Latest Ref Range: 0.00 - 19.99 EU 0.31      Anti-SSA Interpretation Latest Ref Range: Negative  Negative      Anti-SSB Antibody Latest Ref Range: 0.00 - 19.99 EU 0.00      Anti-SSB Interpretation Latest Ref Range: Negative  Negative      ds DNA Ab Latest Ref Range: Negative 1:10  Positive 1:80 (A)      Anti Sm Antibody Latest Ref Range: 0.00 - 19.99 EU 1.22      Anti-Sm Interpretation Latest Ref Range: Negative  Negative      Anti Sm/RNP Antibody Latest Ref Range: 0.00 - 19.99 EU 0.34      Anti-Sm/RNP Interpretation Latest Ref Range: Negative  Negative      Complement (C-3) Latest Ref Range: 50 - 180 mg/dL 55 98 123 130   Complement (C-4) Latest Ref Range: 11 - 44 mg/dL 6 (L) 17 21 25       Medication List with Changes/Refills   Current Medications    DILTIAZEM (CARDIZEM CD) 360 MG 24 HR CAPSULE    TAKE 1 CAPSULE(360 MG) BY MOUTH EVERY DAY    EFINACONAZOLE 10 % TONYA    Apply 1 application topically once daily.    ERGOCALCIFEROL (ERGOCALCIFEROL) 50,000 UNIT CAP    Take 1 capsule (50,000 Units total) by mouth every 7 days.    FUROSEMIDE (LASIX) 40 MG TABLET    Take 1 tablet (40 mg total) by mouth 2 (two) times daily.    HYDROXYCHLOROQUINE (PLAQUENIL) 200 MG TABLET    Take 1 tablet (200 mg total) by mouth 2 (two) times daily.    LABETALOL (NORMODYNE) 200 MG TABLET    Take 1 tablet (200 mg total) by mouth every 12 (twelve) hours.    MELATONIN 5 MG TAB    Take by mouth.    MYCOPHENOLATE (CELLCEPT) 500 MG TAB    Take 2 tablets (1,000 mg total) by mouth 3 (three) times daily.    OLIVE LEAF EXTRACT ORAL    Take by mouth.    PANTOPRAZOLE (PROTONIX) 40 MG TABLET    Take 40 mg by mouth once daily.     PROAIR HFA 90 MCG/ACTUATION INHALER    Inhale 2 puffs into the lungs as needed.    PROMETHAZINE (PHENERGAN) 12.5 MG TAB    Take 1 tablet (12.5 mg  total) by mouth 2 (two) times daily as needed.    VALACYCLOVIR (VALTREX) 500 MG TABLET    Take 1 tablet (500 mg total) by mouth once daily.       Assessment/Plans:-  Stage IV and V lupus nephritis (WHO)  Stable lupus nephritis with stable Urine P/C ratio, normal serum creatinine under treatment by Nehrologist . Serologies and activity labs today. The nausea is less likely to be due to cellcept and more likely to be secondary to gastric bypass.   - C3 complement; Future  - C4 complement; Future  - Anti-DNA antibody, double-stranded; Future  - CBC auto differential; Future  - Comprehensive metabolic panel; Future  - Sedimentation rate, manual; Future  - C-reactive protein; Future  - Protein / creatinine ratio, urine; Future  - Urinalysis; Future    Morbid obesity with BMI of 40.0-44.9, adult  Underwent gastric bypass. Have lost weight significantly since the surgery.     Systemic lupus erythematosus arthritis  Mild synovitis over few MCP's and PIP's without any large joint involvement. Start benlysta. Discussed all adverse effects of the medication. Medication literature given on discharge.   - C3 complement; Future  - C4 complement; Future  - Anti-DNA antibody, double-stranded; Future  - CBC auto differential; Future  - Comprehensive metabolic panel; Future  - Sedimentation rate, manual; Future  - C-reactive protein; Future  - Protein / creatinine ratio, urine; Future  - Urinalysis; Future    Immunosuppression  High dose immunosuppressive therapy. Prevnar 13 and Tdap today.      # Return in about 3 months (around 6/30/2017).      Time spent: 30 minutes in face to face discussion concerning diagnosis, prognosis, review of lab and test results, benefits of treatment as well as management of disease, counseling of patient and coordination of care between various health care providers . Greater than half the time spent was used for coordination of care and counseling of patient.      Disclaimer: This note was  prepared using voice recognition system and is likely to have sound alike errors and is not proof read.  Please call me with any questions.

## 2017-03-31 NOTE — PATIENT INSTRUCTIONS
Belimumab solution for injection  What is this medicine?  BELIMUMAB (be GASPAR ue mab) is a medicine used to treat a certain type of systemic lupus erythematosus (SLE). This medicine is used with standard therapy for SLE. It is not a cure.  How should I use this medicine?  This medicine is for infusion into a vein. It is given by a health care professional in a hospital or clinic setting.  A special MedGuide will be given to you by the pharmacist with each prescription and refill. Be sure to read this information carefully each time.  Talk to your pediatrician regarding the use of this medicine in children. Special care may be needed.  What side effects may I notice from receiving this medicine?  Side effects that you should report to your doctor or health care professional as soon as possible:  · allergic reactions like skin rash, itching or hives, swelling of the face, lips, or tongue  · depressed mood  · signs of infection - fever or chills, cough, sore throat, pain or difficulty passing urine  · suicidal thoughts or other mood changes  · unusually slow heartbeat  Side effects that usually do not require medical attention (Report these to your doctor or health care professional if they continue or are bothersome.):  · diarrhea  · headache  · muscle aches  · nausea, vomiting  · trouble sleeping  What may interact with this medicine?  Do not take this medicine with any of the following medications:  · live virus vaccines  This medicine may also interact with the following medications:  · cyclophosphamide  · ofatumumab  · rituximab  What if I miss a dose?  It is important not to miss your dose. Call your doctor or health care professional if you are unable to keep an appointment.  Where should I keep my medicine?  This drug is given in a hospital or clinic and will not be stored at home.  What should I tell my health care provider before I take this medicine?  They need to know if you have any of these  conditions:  · heart disease  · immune system problems  · infection (especially a virus infection such as chickenpox, cold sores, or herpes)  · mental illness  · suicidal thoughts, plans, or attempt; a previous suicide attempt by you or a family member  · an unusual or allergic reaction to belimumab, other medicines, foods, dyes, or preservatives  · pregnant or trying to get pregnant  · breast-feeding  What should I watch for while using this medicine?  Tell your doctor or healthcare professional if your symptoms do not start to get better or if they get worse.  Your condition will be monitored carefully while you are receiving this medicine.  Date Last Reviewed:   NOTE:This sheet is a summary. It may not cover all possible information. If you have questions about this medicine, talk to your doctor, pharmacist, or health care provider. Copyright© 2016 [employer]

## 2017-04-03 ENCOUNTER — TELEPHONE (OUTPATIENT)
Dept: RHEUMATOLOGY | Facility: HOSPITAL | Age: 32
End: 2017-04-03

## 2017-04-03 NOTE — TELEPHONE ENCOUNTER
Called pt to schedule 1st Benlysta infusion. States she will communicate with Dr. Maza via [facility] re: concerns with receiving Benlysta and the side effects. She is tentatively scheduled to get her first infusion on 4/17/17 pending pt's decision and insurance approval.

## 2017-04-07 ENCOUNTER — TELEPHONE (OUTPATIENT)
Dept: RHEUMATOLOGY | Facility: CLINIC | Age: 32
End: 2017-04-07

## 2017-04-07 NOTE — TELEPHONE ENCOUNTER
----- Message from Brodie Turner LPN sent at 4/7/2017  7:53 AM CDT -----  Regarding: Notification of Unviewed Test Results  Contact: [phone]  Entered by Freddy Maza MD at 4/3/2017  8:12 AM      Blood testing shows stable lupus activity. Urine shows a mild increase in protein. I hope for them to get better as we start benlysta treatment.

## 2017-04-07 NOTE — TELEPHONE ENCOUNTER
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 States she does not want to have even more problems with getting sick than she does now.

## 2017-04-10 ENCOUNTER — TELEPHONE (OUTPATIENT)
Dept: RHEUMATOLOGY | Facility: CLINIC | Age: 32
End: 2017-04-10

## 2017-04-10 NOTE — TELEPHONE ENCOUNTER
No need for benlysta at this time as long as lupus activity returns favorably like this time. Continue follow ups with  to monitor kidneys. Thanks.

## 2017-04-10 NOTE — TELEPHONE ENCOUNTER
----- Message from Brodie Turner LPN sent at 4/10/2017  8:34 AM CDT -----  Regarding: Notification of Unviewed Test Results  Contact: [phone]  Entered by Freddy Maza MD at 4/4/2017  8:21 AM      The double stranded DNA is negative which is a good result.

## 2017-04-10 NOTE — TELEPHONE ENCOUNTER
Advised patient of below, patient would like to know what this means and if she still needs to do the benlysta infusions. Please Advise.

## 2017-04-24 ENCOUNTER — LAB VISIT (OUTPATIENT)
Dept: LAB | Facility: HOSPITAL | Age: 32
End: 2017-04-24
Attending: INTERNAL MEDICINE
Payer: COMMERCIAL

## 2017-04-24 DIAGNOSIS — M32.14 LUPUS NEPHRITIS: ICD-10-CM

## 2017-04-24 LAB
BASOPHILS # BLD AUTO: 0.02 K/UL
BASOPHILS NFR BLD: 0.3 %
DIFFERENTIAL METHOD: ABNORMAL
EOSINOPHIL # BLD AUTO: 0.2 K/UL
EOSINOPHIL NFR BLD: 2.8 %
ERYTHROCYTE [DISTWIDTH] IN BLOOD BY AUTOMATED COUNT: 14.2 %
HCT VFR BLD AUTO: 33.8 %
HGB BLD-MCNC: 11.3 G/DL
LYMPHOCYTES # BLD AUTO: 3.2 K/UL
LYMPHOCYTES NFR BLD: 40.1 %
MCH RBC QN AUTO: 29.8 PG
MCHC RBC AUTO-ENTMCNC: 33.4 %
MCV RBC AUTO: 89 FL
MONOCYTES # BLD AUTO: 0.6 K/UL
MONOCYTES NFR BLD: 7.8 %
NEUTROPHILS # BLD AUTO: 3.9 K/UL
NEUTROPHILS NFR BLD: 48.7 %
PLATELET # BLD AUTO: 240 K/UL
PMV BLD AUTO: 10.9 FL
RBC # BLD AUTO: 3.79 M/UL
WBC # BLD AUTO: 7.91 K/UL

## 2017-04-24 PROCEDURE — 85025 COMPLETE CBC W/AUTO DIFF WBC: CPT

## 2017-04-24 PROCEDURE — 36415 COLL VENOUS BLD VENIPUNCTURE: CPT | Mod: PO

## 2017-04-24 PROCEDURE — 80069 RENAL FUNCTION PANEL: CPT

## 2017-04-25 ENCOUNTER — OFFICE VISIT (OUTPATIENT)
Dept: OBSTETRICS AND GYNECOLOGY | Facility: CLINIC | Age: 32
End: 2017-04-25
Payer: COMMERCIAL

## 2017-04-25 ENCOUNTER — PATIENT MESSAGE (OUTPATIENT)
Dept: NEPHROLOGY | Facility: CLINIC | Age: 32
End: 2017-04-25

## 2017-04-25 DIAGNOSIS — N76.2 ACUTE VULVITIS: Primary | ICD-10-CM

## 2017-04-25 LAB
ALBUMIN SERPL BCP-MCNC: 3.4 G/DL
ANION GAP SERPL CALC-SCNC: 12 MMOL/L
BUN SERPL-MCNC: 18 MG/DL
CALCIUM SERPL-MCNC: 9 MG/DL
CHLORIDE SERPL-SCNC: 110 MMOL/L
CO2 SERPL-SCNC: 18 MMOL/L
CREAT SERPL-MCNC: 0.8 MG/DL
EST. GFR  (AFRICAN AMERICAN): >60 ML/MIN/1.73 M^2
EST. GFR  (NON AFRICAN AMERICAN): >60 ML/MIN/1.73 M^2
GLUCOSE SERPL-MCNC: 70 MG/DL
PHOSPHATE SERPL-MCNC: 4.5 MG/DL
POTASSIUM SERPL-SCNC: 4.1 MMOL/L
SODIUM SERPL-SCNC: 140 MMOL/L

## 2017-04-25 PROCEDURE — 99212 OFFICE O/P EST SF 10 MIN: CPT | Mod: S$GLB,,, | Performed by: OBSTETRICS & GYNECOLOGY

## 2017-04-25 RX ORDER — TRIAMCINOLONE ACETONIDE 1 MG/G
OINTMENT TOPICAL 2 TIMES DAILY
Qty: 15 G | Refills: 1 | Status: SHIPPED | OUTPATIENT
Start: 2017-04-25 | End: 2017-06-01

## 2017-04-25 RX ORDER — DILTIAZEM HYDROCHLORIDE 360 MG/1
CAPSULE, EXTENDED RELEASE ORAL
Qty: 30 CAPSULE | Refills: 11 | Status: SHIPPED | OUTPATIENT
Start: 2017-04-25 | End: 2018-01-09 | Stop reason: ALTCHOICE

## 2017-04-25 NOTE — PROGRESS NOTES
Patient with vaginitis and vulvitis treated at home with otc Monistat until yesterday  Continues with some external irritation  Will treat with Kenalog ointment and follow up with wet prep in 3 days.

## 2017-04-27 ENCOUNTER — PATIENT MESSAGE (OUTPATIENT)
Dept: OBSTETRICS AND GYNECOLOGY | Facility: CLINIC | Age: 32
End: 2017-04-27

## 2017-05-01 ENCOUNTER — TELEPHONE (OUTPATIENT)
Dept: OBSTETRICS AND GYNECOLOGY | Facility: CLINIC | Age: 32
End: 2017-05-01

## 2017-05-01 NOTE — TELEPHONE ENCOUNTER
----- Message from Nohemi Marcial sent at 4/28/2017  3:33 PM CDT -----  Contact: patient  States she appreciate you working with her unfortunately she can't make her appointment today either . Please call patient ASAP @ [phone]. Thanks, kristina

## 2017-05-08 ENCOUNTER — OFFICE VISIT (OUTPATIENT)
Dept: RHEUMATOLOGY | Facility: CLINIC | Age: 32
End: 2017-05-08
Payer: COMMERCIAL

## 2017-05-08 VITALS
SYSTOLIC BLOOD PRESSURE: 133 MMHG | DIASTOLIC BLOOD PRESSURE: 84 MMHG | HEART RATE: 72 BPM | BODY MASS INDEX: 39.83 KG/M2 | WEIGHT: 203.94 LBS

## 2017-05-08 DIAGNOSIS — D84.9 IMMUNOSUPPRESSION: ICD-10-CM

## 2017-05-08 DIAGNOSIS — J20.9 ACUTE BRONCHITIS, UNSPECIFIED ORGANISM: ICD-10-CM

## 2017-05-08 DIAGNOSIS — J01.00 SUBACUTE MAXILLARY SINUSITIS: ICD-10-CM

## 2017-05-08 DIAGNOSIS — M32.9 SYSTEMIC LUPUS ERYTHEMATOSUS ARTHRITIS: Primary | ICD-10-CM

## 2017-05-08 DIAGNOSIS — M32.14 STAGE IV LUPUS NEPHRITIS (WHO): ICD-10-CM

## 2017-05-08 PROCEDURE — 99999 PR PBB SHADOW E&M-EST. PATIENT-LVL III: CPT | Mod: PBBFAC,,, | Performed by: INTERNAL MEDICINE

## 2017-05-08 PROCEDURE — 3075F SYST BP GE 130 - 139MM HG: CPT | Mod: S$GLB,,, | Performed by: INTERNAL MEDICINE

## 2017-05-08 PROCEDURE — 1160F RVW MEDS BY RX/DR IN RCRD: CPT | Mod: S$GLB,,, | Performed by: INTERNAL MEDICINE

## 2017-05-08 PROCEDURE — 3079F DIAST BP 80-89 MM HG: CPT | Mod: S$GLB,,, | Performed by: INTERNAL MEDICINE

## 2017-05-08 PROCEDURE — 99214 OFFICE O/P EST MOD 30 MIN: CPT | Mod: S$GLB,,, | Performed by: INTERNAL MEDICINE

## 2017-05-08 RX ORDER — FLUCONAZOLE 150 MG/1
150 TABLET ORAL DAILY
Qty: 5 TABLET | Refills: 3 | Status: SHIPPED | OUTPATIENT
Start: 2017-05-08 | End: 2017-06-28

## 2017-05-08 RX ORDER — AMOXICILLIN AND CLAVULANATE POTASSIUM 500; 125 MG/1; MG/1
1 TABLET, FILM COATED ORAL 3 TIMES DAILY
Qty: 15 TABLET | Refills: 0 | Status: SHIPPED | OUTPATIENT
Start: 2017-05-08 | End: 2017-05-13

## 2017-05-08 NOTE — MR AVS SNAPSHOT
[facility]  [address] [facility] [place] [address]  Phone: [phone]  Fax: [phone]                  Marsha Marina   2017 3:00 PM   Office Visit    Description:  Female : 1985   Provider:  Freddy Maza MD   Department:  [facility]           Reason for Visit     Disease Management           Diagnoses this Visit        Comments    Systemic lupus erythematosus arthritis    -  Primary     Stage IV lupus nephritis (WHO)         Immunosuppression         Acute bronchitis, unspecified organism                To Do List           Future Appointments        Provider Department Dept Phone    2017 5:00 PM MD Imelda Preciado IV - Urology [phone]    2017 2:30 PM MD Imelda Gallegos - Nephrology [phone]      Goals (5 Years of Data)     None      Follow-Up and Disposition     Return in about 3 months (around 2017).       These Medications        Disp Refills Start End    amoxicillin-clavulanate 500-125mg (AUGMENTIN) 500-125 mg Tab 15 tablet 0 2017    Take 1 tablet (500 mg total) by mouth 3 (three) times daily. - Oral    Pharmacy: [facility] [address] [facility] Ph #: [phone]       fluconazole (DIFLUCAN) 150 MG Tab 5 tablet 3 2017     Take 1 tablet (150 mg total) by mouth once daily. - Oral    Pharmacy: [facility] [address] [facility] Ph #: [phone]         [facility]     [facility] Care [facility] -  Assistance  Unless otherwise directed by your provider, please contact [facility], our nurse care line that is available for  assistance.     Registered nurses in the [facility] provide: appointment scheduling, clinical advisement, health education, and other advisory services.  Call: [phone] (toll free)               Medications            Message regarding Medications     Verify the changes and/or additions to your medication regime listed below are the same as discussed with your clinician today.  If any of these changes or additions are incorrect, please notify your healthcare provider.        START taking these NEW medications        Refills    amoxicillin-clavulanate 500-125mg (AUGMENTIN) 500-125 mg Tab 0    Sig: Take 1 tablet (500 mg total) by mouth 3 (three) times daily.    Class: Normal    Route: Oral    fluconazole (DIFLUCAN) 150 MG Tab 3    Sig: Take 1 tablet (150 mg total) by mouth once daily.    Class: Normal    Route: Oral           Verify that the below list of medications is an accurate representation of the medications you are currently taking.  If none reported, the list may be blank. If incorrect, please contact your healthcare provider. Carry this list with you in case of emergency.           Current Medications     amoxicillin-clavulanate 500-125mg (AUGMENTIN) 500-125 mg Tab Take 1 tablet (500 mg total) by mouth 3 (three) times daily.    diltiaZEM (CARDIZEM CD) 360 MG 24 hr capsule TAKE 1 CAPSULE(360 MG) BY MOUTH EVERY DAY    ergocalciferol (ERGOCALCIFEROL) 50,000 unit Cap Take 1 capsule (50,000 Units total) by mouth every 7 days.    fluconazole (DIFLUCAN) 150 MG Tab Take 1 tablet (150 mg total) by mouth once daily.    hydroxychloroquine (PLAQUENIL) 200 mg tablet Take 1 tablet (200 mg total) by mouth 2 (two) times daily.    labetalol (NORMODYNE) 200 MG tablet Take 1 tablet (200 mg total) by mouth every 12 (twelve) hours.    mycophenolate (CELLCEPT) 500 mg Tab Take 2 tablets (1,000 mg total) by mouth 3 (three) times daily.    OLIVE LEAF EXTRACT ORAL Take by mouth.    pantoprazole (PROTONIX) 40 MG tablet Take 40 mg by mouth once daily.     PROAIR HFA 90 mcg/actuation inhaler Inhale 2 puffs into the lungs as needed.    promethazine (PHENERGAN) 12.5 MG Tab Take 1 tablet (12.5 mg total) by mouth 2 (two) times daily as needed.     triamcinolone acetonide 0.1% (KENALOG) 0.1 % ointment Apply topically 2 (two) times daily.    valacyclovir (VALTREX) 500 MG tablet Take 1 tablet (500 mg total) by mouth once daily.           Clinical Reference Information           Your Vitals Were     BP Pulse Weight BMI       133/84 72 92.5 kg (203 lb 14.8 oz) 39.83 kg/m2       Blood Pressure          Most Recent Value    BP  133/84      Allergies as of 5/8/2017     Aspartame    Codeine    Morphine      Immunizations Administered on Date of Encounter - 5/8/2017     None      Language Assistance Services     ATTENTION: Language assistance services are available, free of charge. Please call [phone].      ATENCIÓN: Si amanla julio, tiene a cueto disposición servicios gratuitos de asistencia lingüística. Mildred al [phone].     CHÚ Ý: N?u b?n nói Ti?ng Vi?t, có các d?ch v? h? tr? ngôn ng? mi?n phí dành cho b?n. G?i s? [phone].         [facility] - Rheumatology complies with applicable Federal civil rights laws and does not discriminate on the basis of race, color, national origin, age, disability, or sex.

## 2017-05-08 NOTE — ASSESSMENT & PLAN NOTE
New onset , persistent sinusitis not responding to conventional therapies. Try one course of augmentin because of immunosuppressive status. Hold cellcept until symptoms resolve.

## 2017-05-08 NOTE — PROGRESS NOTES
RHEUMATOLOGY CLINIC FOLLOW UP VISIT  Chief complaints:-  Have sinus infection and have been coughing a lot.     HPI:-  Marsha Serrano a 31 y.o. pleasant female comes in for a follow up visit with above chief complaints. She has complex PMH including hypertensive encephalopathy and lupus nephritis. She is on 3 g of CellCept a day with Plaquenil.  Today she reports worsening cough and pain with sensation of congested sinuses , not responding to the usual OTC measures. No fever/ chills.     Physical examination:-    Vitals:    05/08/17 1512   BP: 133/84   Pulse: 72   Weight: 92.5 kg (203 lb 14.8 oz)   PainSc:   6   PainLoc: Head       Physical Exam   Constitutional: She is oriented to person, place, and time and well-developed, well-nourished, and in no distress. No distress.   HENT:   Head: Normocephalic.   Mouth/Throat: Oropharynx is clear and moist.   Eyes: Conjunctivae and EOM are normal. Pupils are equal, round, and reactive to light. Right eye exhibits no discharge. Left eye exhibits no discharge. No scleral icterus.   Neck: Normal range of motion. Neck supple.   Cardiovascular: Normal rate and intact distal pulses.    Pulmonary/Chest: Effort normal. No respiratory distress.   Abdominal: Soft. There is no tenderness.   Musculoskeletal:   No synovitis over small joints.   Bilateral wrists nontender.  No effusion. No sclerodactyly or telangiectasias. No malar rash. Normal nail fold capillaries.    Lymphadenopathy:     She has no cervical adenopathy.   Neurological: She is alert and oriented to person, place, and time. No cranial nerve deficit.   Skin: Skin is warm. No rash noted. She is not diaphoretic. No erythema.   Psychiatric: Mood, affect and judgment normal.   Nursing note and vitals reviewed.      Labs:-  Results for MARSHA ROMEO (MRN [id number]) as of 3/31/2017 16:04   Ref. Range 4/11/2016 14:35 5/9/2016 16:25 8/18/2016 15:00 11/9/2016 10:46   DEEP HEP-2 Titer Unknown Positive 1:640 Ho...       Anti-SSA Antibody Latest Ref Range: 0.00 - 19.99 EU 0.31      Anti-SSA Interpretation Latest Ref Range: Negative  Negative      Anti-SSB Antibody Latest Ref Range: 0.00 - 19.99 EU 0.00      Anti-SSB Interpretation Latest Ref Range: Negative  Negative      ds DNA Ab Latest Ref Range: Negative 1:10  Positive 1:80 (A)      Anti Sm Antibody Latest Ref Range: 0.00 - 19.99 EU 1.22      Anti-Sm Interpretation Latest Ref Range: Negative  Negative      Anti Sm/RNP Antibody Latest Ref Range: 0.00 - 19.99 EU 0.34      Anti-Sm/RNP Interpretation Latest Ref Range: Negative  Negative      Complement (C-3) Latest Ref Range: 50 - 180 mg/dL 55 98 123 130   Complement (C-4) Latest Ref Range: 11 - 44 mg/dL 6 (L) 17 21 25       Medication List with Changes/Refills   Current Medications    DILTIAZEM (CARDIZEM CD) 360 MG 24 HR CAPSULE    TAKE 1 CAPSULE(360 MG) BY MOUTH EVERY DAY    ERGOCALCIFEROL (ERGOCALCIFEROL) 50,000 UNIT CAP    Take 1 capsule (50,000 Units total) by mouth every 7 days.    HYDROXYCHLOROQUINE (PLAQUENIL) 200 MG TABLET    Take 1 tablet (200 mg total) by mouth 2 (two) times daily.    LABETALOL (NORMODYNE) 200 MG TABLET    Take 1 tablet (200 mg total) by mouth every 12 (twelve) hours.    MYCOPHENOLATE (CELLCEPT) 500 MG TAB    Take 2 tablets (1,000 mg total) by mouth 3 (three) times daily.    OLIVE LEAF EXTRACT ORAL    Take by mouth.    PANTOPRAZOLE (PROTONIX) 40 MG TABLET    Take 40 mg by mouth once daily.     PROAIR HFA 90 MCG/ACTUATION INHALER    Inhale 2 puffs into the lungs as needed.    PROMETHAZINE (PHENERGAN) 12.5 MG TAB    Take 1 tablet (12.5 mg total) by mouth 2 (two) times daily as needed.    TRIAMCINOLONE ACETONIDE 0.1% (KENALOG) 0.1 % OINTMENT    Apply topically 2 (two) times daily.    VALACYCLOVIR (VALTREX) 500 MG TABLET    Take 1 tablet (500 mg total) by mouth once daily.       Assessment/Plans:-  # Subacute maxillary sinusitis:-  New onset , persistent sinusitis not responding to conventional  therapies. Try one course of augmentin because of immunosuppressive status. Hold cellcept until symptoms resolve.   - amoxicillin-clavulanate 500-125mg (AUGMENTIN) 500-125 mg Tab; Take 1 tablet (500 mg total) by mouth 3 (three) times daily.  Dispense: 15 tablet; Refill: 0    # Stage IV and V lupus nephritis (WHO):-  Stable lupus nephritis with stable Urine P/C ratio, normal serum creatinine under treatment by Nehrologist . Continue cellcept and plaquenil .     # Morbid obesity with BMI of 40.0-44.9, adult:-  Underwent gastric bypass. Have lost weight significantly since the surgery.     # Systemic lupus erythematosus arthritis:-  Stable. No synovitis on exam today. Monitor.     # Immunosuppression:-  High dose immunosuppressive therapy. Hold medications with any infection.       # Return in about 3 months (around 8/8/2017).      Time spent: 30 minutes in face to face discussion concerning diagnosis, prognosis, review of lab and test results, benefits of treatment as well as management of disease, counseling of patient and coordination of care between various health care providers . Greater than half the time spent was used for coordination of care and counseling of patient.      Disclaimer: This note was prepared using voice recognition system and is likely to have sound alike errors and is not proof read.  Please call me with any questions.

## 2017-05-09 ENCOUNTER — OFFICE VISIT (OUTPATIENT)
Dept: UROLOGY | Facility: CLINIC | Age: 32
End: 2017-05-09
Payer: COMMERCIAL

## 2017-05-09 VITALS
BODY MASS INDEX: 39.84 KG/M2 | DIASTOLIC BLOOD PRESSURE: 82 MMHG | SYSTOLIC BLOOD PRESSURE: 118 MMHG | HEART RATE: 68 BPM | WEIGHT: 204 LBS

## 2017-05-09 DIAGNOSIS — R31.9 HEMATURIA: Primary | ICD-10-CM

## 2017-05-09 PROCEDURE — 87086 URINE CULTURE/COLONY COUNT: CPT

## 2017-05-09 PROCEDURE — 99244 OFF/OP CNSLTJ NEW/EST MOD 40: CPT | Mod: S$GLB,,, | Performed by: UROLOGY

## 2017-05-09 PROCEDURE — 81001 URINALYSIS AUTO W/SCOPE: CPT

## 2017-05-09 PROCEDURE — 99999 PR PBB SHADOW E&M-EST. PATIENT-LVL II: CPT | Mod: PBBFAC,,, | Performed by: UROLOGY

## 2017-05-09 NOTE — PROGRESS NOTES
Chief Complaint: Microhematuria    HPI:   5/9/17: 30 yo pt with renal problems from SLE is here for microhematuria referred by Dr. Avery.  Works as an  in elementary school.  No abd/pelvic pain and no exac/rel factors.  No gross hematuria.  No urolithiasis.  No urinary bother.  No  history.  Normal sexual function.  No abnormal imaging, but incomplete.    Allergies:  Aspartame; Codeine; and Morphine    Medications: has a current medication list which includes the following prescription(s): amoxicillin-clavulanate 500-125mg, diltiazem, ergocalciferol, fluconazole, hydroxychloroquine, labetalol, mycophenolate, pantoprazole, proair hfa, promethazine, valacyclovir, olive leaf extract, and triamcinolone acetonide 0.1%.    Review of Systems:  General: No fever, chills, fatigability, or weight loss.  Skin: No rashes, itching, or changes in color or texture of skin.  Chest: Denies YAN, cyanosis, wheezing, cough, and sputum production.  Abdomen: Appetite fine. No weight loss. Denies diarrhea, abdominal pain, hematemesis, or blood in stool.  Musculoskeletal: No joint stiffness or swelling. Denies back pain.  : As above.  All other review of systems negative.    PMH:   has a past medical history of Abnormal Pap smear of vagina; Asthma; Disorder of kidney and ureter; Genital herpes; Hypertension; and SLE (systemic lupus erythematosus).    PSH:   has a past surgical history that includes Cyst Removal; [place] tooth extraction; Renal biopsy (4/4/16); and Gastric sleeve.    FamHx: family history includes Asthma in her mother and sister; Diabetes in her other; Heart disease in her other; Hyperlipidemia in her father; Hypertension in her other; Rashes / Skin problems in her sister; Stroke in her other. There is no history of Breast cancer, Colon cancer, or Ovarian cancer.    SocHx:  reports that she has never smoked. She does not have any smokeless tobacco history on file. She reports that she drinks  alcohol. She reports that she does not use illicit drugs.     Physical Exam:  Vitals:   Vitals:    05/09/17 1651   BP: 118/82   Pulse: 68     General: A&Ox3. No apparent distress. No deformities.  Neck: No masses. Normal thyroid.  Lungs: normal inspiration. No use of accessory muscles.  Heart: normal pulse. No arrhythmias.  Abdomen: Soft. NT. ND. No masses. No hernias. No hepatosplenomegaly.  Lymphatic: Neck and groin nodes negative.  Skin: The skin is warm and dry. No jaundice.  Ext: No c/c/e.  : External genitalia normal.     Labs/Studies: Urinalysis performed in clinic, summary: UA normal exc 50 rbc    Impression/Plan:   1. Will first check with cath UA/UCx to determine if microhematuria truly present.  Recheck 1 mo.  If either >5RBC/HPF will move to workup with CT/Urogram.

## 2017-05-09 NOTE — MR AVS SNAPSHOT
[facility]  [address] [facility] [address]  Phone: [phone]  Fax: [phone]                  Marsha Marina   2017 5:00 PM   Office Visit    Description:  Female : 1985   Provider:  Kosta Neff IV, MD   Department:  [facility]           Diagnoses this Visit        Comments    Hematuria    -  Primary            To Do List           Future Appointments        Provider Department Dept Phone    2017 2:30 PM [facility] [phone]    2017 9[facility]20 [facility] [phone]    8/3/2017 3:00 PM Freddy Maza MD [facility] - Rheumatology [phone]    8/3/2017 3:30 PM Hussain Avery MD [facility] Nephrology [phone]      Goals (5 Years of Data)     None      [facility]     [facility] -  Assistance  Unless otherwise directed by your provider, please contact [facility], our nurse care line that is available for  assistance.     Registered nurses in the [facility] provide: appointment scheduling, clinical advisement, health education, and other advisory services.  Call: [phone] (toll free)               Medications           Message regarding Medications     Verify the changes and/or additions to your medication regime listed below are the same as discussed with your clinician today.  If any of these changes or additions are incorrect, please notify your healthcare provider.             Verify that the below list of medications is an accurate representation of the medications you are currently taking.  If none reported, the list may be blank. If incorrect, please contact your healthcare provider. Carry this list with you in case of emergency.           Current Medications     amoxicillin-clavulanate 500-125mg (AUGMENTIN) 500-125 mg Tab Take 1 tablet (500 mg total) by mouth 3 (three) times daily.    diltiaZEM (CARDIZEM CD) 360  MG 24 hr capsule TAKE 1 CAPSULE(360 MG) BY MOUTH EVERY DAY    ergocalciferol (ERGOCALCIFEROL) 50,000 unit Cap Take 1 capsule (50,000 Units total) by mouth every 7 days.    fluconazole (DIFLUCAN) 150 MG Tab Take 1 tablet (150 mg total) by mouth once daily.    hydroxychloroquine (PLAQUENIL) 200 mg tablet Take 1 tablet (200 mg total) by mouth 2 (two) times daily.    labetalol (NORMODYNE) 200 MG tablet Take 1 tablet (200 mg total) by mouth every 12 (twelve) hours.    mycophenolate (CELLCEPT) 500 mg Tab Take 2 tablets (1,000 mg total) by mouth 3 (three) times daily.    pantoprazole (PROTONIX) 40 MG tablet Take 40 mg by mouth once daily.     PROAIR HFA 90 mcg/actuation inhaler Inhale 2 puffs into the lungs as needed.    promethazine (PHENERGAN) 12.5 MG Tab Take 1 tablet (12.5 mg total) by mouth 2 (two) times daily as needed.    valacyclovir (VALTREX) 500 MG tablet Take 1 tablet (500 mg total) by mouth once daily.    OLIVE LEAF EXTRACT ORAL Take by mouth.    triamcinolone acetonide 0.1% (KENALOG) 0.1 % ointment Apply topically 2 (two) times daily.           Clinical Reference Information           Your Vitals Were     BP Pulse Weight BMI       118/82 (BP Location: Left arm, Patient Position: Sitting) 68 92.5 kg (204 lb) 39.84 kg/m2       Blood Pressure          Most Recent Value    BP  118/82      Allergies as of 5/9/2017     Aspartame    Codeine    Morphine      Immunizations Administered on Date of Encounter - 5/9/2017     None      Orders Placed During Today's Visit      Normal Orders This Visit    Urinalysis Microscopic     Urine culture       Language Assistance Services     ATTENTION: Language assistance services are available, free of charge. Please call [phone].      ATENCIÓN: Si amanla julio, tiene a cueto disposición servicios gratuitos de asistencia lingüística. Llame al [phone].     CHÚ Ý: N?u b?n nói Ti?ng Vi?t, có các d?ch v? h? tr? ngôn ng? mi?n phí dành cho b?n. G?i s? [phone].          [facility] complies with applicable Federal civil rights laws and does not discriminate on the basis of race, color, national origin, age, disability, or sex.

## 2017-05-09 NOTE — LETTER
May 9, 2017      Hussain Avery MD  [address] [facility] [address]           [facility]  [address] [place] [address]  [place] [address]  Phone: [phone]  Fax: [phone]          Patient: Marsha Marina   MR Number: [id number]   YOB: 1985   Date of Visit: 5/9/2017       Dear Dr. Hussain Avery:    Thank you for referring Marsha Marina to me for evaluation. Attached you will find relevant portions of my assessment and plan of care.    If you have questions, please do not hesitate to call me. I look forward to following Marsha Marina along with you.    Sincerely,    Kosta Neff IV, MD    Enclosure  CC:  No Recipients    If you would like to receive this communication electronically, please contact [email or web address] or [phone] to request more information on [employer] Link access.    For providers and/or their staff who would like to refer a patient to [facility], please contact us through our one-stop-shop provider referral line, [facility], at [phone].    If you feel you have received this communication in error or would no longer like to receive these types of communications, please e-mail [email or web address]

## 2017-05-10 LAB
AMORPH CRY UR QL COMP ASSIST: ABNORMAL
BACTERIA #/AREA URNS AUTO: ABNORMAL /HPF
MICROSCOPIC COMMENT: ABNORMAL
WBC #/AREA URNS AUTO: 2 /HPF (ref 0–5)

## 2017-05-11 LAB — BACTERIA UR CULT: NO GROWTH

## 2017-05-17 ENCOUNTER — PATIENT MESSAGE (OUTPATIENT)
Dept: NEPHROLOGY | Facility: CLINIC | Age: 32
End: 2017-05-17

## 2017-05-30 ENCOUNTER — PATIENT MESSAGE (OUTPATIENT)
Dept: RHEUMATOLOGY | Facility: CLINIC | Age: 32
End: 2017-05-30

## 2017-05-30 DIAGNOSIS — M32.9 SYSTEMIC LUPUS ERYTHEMATOSUS ARTHRITIS: Primary | ICD-10-CM

## 2017-05-30 DIAGNOSIS — N25.81 SECONDARY HYPERPARATHYROIDISM OF RENAL ORIGIN: ICD-10-CM

## 2017-05-30 RX ORDER — LABETALOL 200 MG/1
200 TABLET, FILM COATED ORAL EVERY 12 HOURS
Qty: 60 TABLET | Refills: 11 | OUTPATIENT
Start: 2017-05-30 | End: 2018-05-30

## 2017-05-31 RX ORDER — LABETALOL 200 MG/1
200 TABLET, FILM COATED ORAL EVERY 12 HOURS
Qty: 60 TABLET | Refills: 11 | Status: SHIPPED | OUTPATIENT
Start: 2017-05-31 | End: 2018-02-13 | Stop reason: SDUPTHER

## 2017-05-31 RX ORDER — ERGOCALCIFEROL 1.25 MG/1
50000 CAPSULE ORAL
Qty: 12 CAPSULE | Refills: 3 | Status: SHIPPED | OUTPATIENT
Start: 2017-05-31 | End: 2017-11-06

## 2017-05-31 NOTE — TELEPHONE ENCOUNTER
Do you want patient to continue these medications? Other providers are not approving it due to allergy indication. Please advise

## 2017-05-31 NOTE — TELEPHONE ENCOUNTER
1. Hold Cellcept as advised during last visit until the infection subsides.   2. Lets wait for ENT specialist's opinion.   3. Check lupus activity labs to make sure no underlying lupus flare contributing to her symptoms.   Thanks.

## 2017-05-31 NOTE — TELEPHONE ENCOUNTER
Spoke with pt and advised her of below and set up lab appointment today at [facility]. Pt verbalized understanding.

## 2017-06-01 ENCOUNTER — OFFICE VISIT (OUTPATIENT)
Dept: OTOLARYNGOLOGY | Facility: CLINIC | Age: 32
End: 2017-06-01
Payer: COMMERCIAL

## 2017-06-01 ENCOUNTER — LAB VISIT (OUTPATIENT)
Dept: LAB | Facility: HOSPITAL | Age: 32
End: 2017-06-01
Attending: INTERNAL MEDICINE
Payer: COMMERCIAL

## 2017-06-01 VITALS
HEART RATE: 77 BPM | TEMPERATURE: 98 F | BODY MASS INDEX: 39.18 KG/M2 | SYSTOLIC BLOOD PRESSURE: 141 MMHG | DIASTOLIC BLOOD PRESSURE: 98 MMHG | WEIGHT: 200.63 LBS

## 2017-06-01 DIAGNOSIS — J01.91 ACUTE RECURRENT SINUSITIS, UNSPECIFIED LOCATION: Primary | ICD-10-CM

## 2017-06-01 DIAGNOSIS — J35.8 TONSIL STONE: ICD-10-CM

## 2017-06-01 DIAGNOSIS — M32.9 SYSTEMIC LUPUS ERYTHEMATOSUS ARTHRITIS: ICD-10-CM

## 2017-06-01 DIAGNOSIS — J35.1 TONSILLAR HYPERTROPHY: ICD-10-CM

## 2017-06-01 DIAGNOSIS — J30.9 ALLERGIC RHINITIS, UNSPECIFIED ALLERGIC RHINITIS TRIGGER, UNSPECIFIED RHINITIS SEASONALITY: ICD-10-CM

## 2017-06-01 LAB
ALBUMIN SERPL BCP-MCNC: 3.7 G/DL
ALP SERPL-CCNC: 52 U/L
ALT SERPL W/O P-5'-P-CCNC: 11 U/L
ANION GAP SERPL CALC-SCNC: 10 MMOL/L
AST SERPL-CCNC: 19 U/L
BASOPHILS # BLD AUTO: 0.01 K/UL
BASOPHILS NFR BLD: 0.1 %
BILIRUB SERPL-MCNC: 0.4 MG/DL
BUN SERPL-MCNC: 17 MG/DL
C3 SERPL-MCNC: 118 MG/DL
C4 SERPL-MCNC: 19 MG/DL
CALCIUM SERPL-MCNC: 9.5 MG/DL
CHLORIDE SERPL-SCNC: 110 MMOL/L
CO2 SERPL-SCNC: 21 MMOL/L
CREAT SERPL-MCNC: 0.9 MG/DL
CRP SERPL-MCNC: 1.3 MG/L
DIFFERENTIAL METHOD: ABNORMAL
EOSINOPHIL # BLD AUTO: 0 K/UL
EOSINOPHIL NFR BLD: 0.2 %
ERYTHROCYTE [DISTWIDTH] IN BLOOD BY AUTOMATED COUNT: 14.3 %
ERYTHROCYTE [SEDIMENTATION RATE] IN BLOOD BY WESTERGREN METHOD: 11 MM/HR
EST. GFR  (AFRICAN AMERICAN): >60 ML/MIN/1.73 M^2
EST. GFR  (NON AFRICAN AMERICAN): >60 ML/MIN/1.73 M^2
GLUCOSE SERPL-MCNC: 76 MG/DL
HCT VFR BLD AUTO: 36 %
HGB BLD-MCNC: 11.9 G/DL
LYMPHOCYTES # BLD AUTO: 3.4 K/UL
LYMPHOCYTES NFR BLD: 32.8 %
MCH RBC QN AUTO: 29.8 PG
MCHC RBC AUTO-ENTMCNC: 33.1 %
MCV RBC AUTO: 90 FL
MONOCYTES # BLD AUTO: 0.8 K/UL
MONOCYTES NFR BLD: 7.4 %
NEUTROPHILS # BLD AUTO: 6.2 K/UL
NEUTROPHILS NFR BLD: 59.3 %
PLATELET # BLD AUTO: 217 K/UL
PMV BLD AUTO: 10.4 FL
POTASSIUM SERPL-SCNC: 3.6 MMOL/L
PROT SERPL-MCNC: 6.8 G/DL
RBC # BLD AUTO: 3.99 M/UL
SODIUM SERPL-SCNC: 141 MMOL/L
WBC # BLD AUTO: 10.43 K/UL

## 2017-06-01 PROCEDURE — 36415 COLL VENOUS BLD VENIPUNCTURE: CPT | Mod: PO

## 2017-06-01 PROCEDURE — 86160 COMPLEMENT ANTIGEN: CPT

## 2017-06-01 PROCEDURE — 86160 COMPLEMENT ANTIGEN: CPT | Mod: 59

## 2017-06-01 PROCEDURE — 99214 OFFICE O/P EST MOD 30 MIN: CPT | Mod: S$GLB,,, | Performed by: PHYSICIAN ASSISTANT

## 2017-06-01 PROCEDURE — 80053 COMPREHEN METABOLIC PANEL: CPT

## 2017-06-01 PROCEDURE — 86038 ANTINUCLEAR ANTIBODIES: CPT

## 2017-06-01 PROCEDURE — 86140 C-REACTIVE PROTEIN: CPT

## 2017-06-01 PROCEDURE — 85651 RBC SED RATE NONAUTOMATED: CPT | Mod: PO

## 2017-06-01 PROCEDURE — 99999 PR PBB SHADOW E&M-EST. PATIENT-LVL IV: CPT | Mod: PBBFAC,,, | Performed by: PHYSICIAN ASSISTANT

## 2017-06-01 PROCEDURE — 85025 COMPLETE CBC W/AUTO DIFF WBC: CPT

## 2017-06-01 RX ORDER — LEVOCETIRIZINE DIHYDROCHLORIDE 5 MG/1
5 TABLET, FILM COATED ORAL NIGHTLY
Qty: 30 TABLET | Refills: 11 | Status: SHIPPED | OUTPATIENT
Start: 2017-06-01 | End: 2017-06-28

## 2017-06-01 RX ORDER — CEFDINIR 300 MG/1
1 CAPSULE ORAL 2 TIMES DAILY
COMMUNITY
Start: 2017-05-30 | End: 2017-06-28 | Stop reason: ALTCHOICE

## 2017-06-01 RX ORDER — FLUTICASONE PROPIONATE 50 MCG
2 SPRAY, SUSPENSION (ML) NASAL DAILY
COMMUNITY
Start: 2017-05-30

## 2017-06-01 RX ORDER — AZELASTINE 1 MG/ML
2 SPRAY, METERED NASAL DAILY
COMMUNITY
Start: 2017-03-02 | End: 2017-06-28

## 2017-06-01 NOTE — PROGRESS NOTES
Subjective:       Patient ID: Marsha Marina is a 31 y.o. female.    Chief Complaint: Other (Would like to discuss possible tonsillectomy & adenoidectomy)    Patient is a very pleasant 31-year-old female here to see me today for the first time for evaluation of recurrent sinusitis.  She was last here in 6/2015 with Dr. Carson for evaluation of recurrent tonsil stones as well as enlarged tonsils and they had planned tonsillectomy at that time.  Patient did not proceed with surgery but is back today to discuss further.  She has a history of Lupus and is on Plaquenil and has recently been treated with 5 courses of antibiotics since January 2017 for sinusitis and URI and has not had sustained improvement.  She's been treated with Augmentin twice, Cefdinir, and Zithromax, as well as a steroid Dosepak.  She's currently complaining of significant sinus pressure, mid facial pain, congestion, and postnasal drainage.  She says her cough is improving.  She's not had fever for the past 3-4 days.  She's continued to use Zyrtec and Flonase.  She's not been sleeping well and continues to snore loudly.  She is again considering elective tonsillectomy.      Review of Systems   Constitutional: Positive for diaphoresis and fever. Negative for appetite change.   HENT: Positive for congestion, postnasal drip, rhinorrhea, sinus pressure, sore throat, trouble swallowing (sometimes) and voice change (hoarse at times). Negative for ear discharge, ear pain and nosebleeds.    Eyes: Positive for discharge and itching.   Respiratory: Positive for cough (improving). Negative for shortness of breath.    Cardiovascular: Negative for chest pain and palpitations.   Gastrointestinal: Positive for diarrhea and vomiting. Negative for nausea.   Musculoskeletal: Negative for neck pain.   Allergic/Immunologic: Positive for environmental allergies. Negative for food allergies.   Neurological: Positive for headaches. Negative for light-headedness.    Hematological: Negative for adenopathy.       Objective:      Physical Exam   Constitutional: She is oriented to person, place, and time. She appears well-developed and well-nourished. No distress.   HENT:   Head: Normocephalic and atraumatic.   Right Ear: Tympanic membrane, external ear and ear canal normal.   Left Ear: Tympanic membrane, external ear and ear canal normal.   Nose: Mucosal edema and rhinorrhea present. No nasal deformity or septal deviation. No epistaxis. Right sinus exhibits maxillary sinus tenderness and frontal sinus tenderness. Left sinus exhibits maxillary sinus tenderness and frontal sinus tenderness.       Mouth/Throat: Uvula is midline, oropharynx is clear and moist and mucous membranes are normal. Mucous membranes are not pale and not dry. Normal dentition. No oropharyngeal exudate or posterior oropharyngeal erythema. Tonsils are 2+ on the right. Tonsils are 2+ on the left. No tonsillar exudate (no tonsiliths visualized today).   Eyes: Conjunctivae, EOM and lids are normal. Pupils are equal, round, and reactive to light. Right eye exhibits no chemosis. Left eye exhibits no chemosis. Right conjunctiva is not injected. Left conjunctiva is not injected. No scleral icterus. Right eye exhibits normal extraocular motion and no nystagmus. Left eye exhibits normal extraocular motion and no nystagmus.   Neck: Trachea normal and phonation normal. No tracheal tenderness present. No tracheal deviation present. No thyroid mass and no thyromegaly present.   Cardiovascular: Intact distal pulses.    Pulmonary/Chest: Effort normal. No stridor. No respiratory distress.   Abdominal: She exhibits no distension.   Lymphadenopathy:        Head (right side): No submental, no submandibular, no preauricular, no posterior auricular and no occipital adenopathy present.        Head (left side): No submental, no submandibular, no preauricular, no posterior auricular and no occipital adenopathy present.     She has no  cervical adenopathy.   Neurological: She is alert and oriented to person, place, and time. No cranial nerve deficit.   Skin: Skin is warm and dry. No rash noted. No erythema.   Psychiatric: She has a normal mood and affect. Her behavior is normal.       Assessment:       1. Acute recurrent sinusitis, unspecified location    2. Allergic rhinitis, unspecified allergic rhinitis trigger, unspecified rhinitis seasonality    3. Tonsillar hypertrophy    4. Tonsil stone        Plan:       At this point, the patient has been on multiple rounds of antibiotics including Augmentin, Omnicef and Zithromax without significant or sustained improvement in their symptoms.  I would recommend a CT of the sinuses for further evaluation.  If the scan shows persistent sinus disease, she will then need a longer course of antibiotics, likely three to four weeks of Levaquin.  If the scan does not show persistent infection, the patient's symptoms are likely due to underlying allergies and would then maximize allergy therapy, possibly considering skin testing if needed.  The patient understands this treatment plan, and will call with results when available.    Recommend she continue Flonase and will change to Xyzal.  Recommend she return to clinic to see Dr. Carson in 2-3 weeks for recheck and to further discuss tonsillectomy.

## 2017-06-02 ENCOUNTER — TELEPHONE (OUTPATIENT)
Dept: RADIOLOGY | Facility: HOSPITAL | Age: 32
End: 2017-06-02

## 2017-06-02 LAB — ANA SER QL IF: NORMAL

## 2017-06-05 ENCOUNTER — TELEPHONE (OUTPATIENT)
Dept: OTOLARYNGOLOGY | Facility: CLINIC | Age: 32
End: 2017-06-05

## 2017-06-05 ENCOUNTER — PATIENT MESSAGE (OUTPATIENT)
Dept: RHEUMATOLOGY | Facility: CLINIC | Age: 32
End: 2017-06-05

## 2017-06-05 ENCOUNTER — HOSPITAL ENCOUNTER (OUTPATIENT)
Dept: RADIOLOGY | Facility: HOSPITAL | Age: 32
Discharge: HOME | End: 2017-06-05
Attending: ORTHOPAEDIC SURGERY
Payer: COMMERCIAL

## 2017-06-05 DIAGNOSIS — J01.91 ACUTE RECURRENT SINUSITIS, UNSPECIFIED LOCATION: ICD-10-CM

## 2017-06-05 PROCEDURE — 70486 CT MAXILLOFACIAL W/O DYE: CPT | Mod: TC,PO

## 2017-06-05 PROCEDURE — 70486 CT MAXILLOFACIAL W/O DYE: CPT | Mod: 26,,, | Performed by: RADIOLOGY

## 2017-06-05 RX ORDER — AMOXICILLIN AND CLAVULANATE POTASSIUM 875; 125 MG/1; MG/1
1 TABLET, FILM COATED ORAL 2 TIMES DAILY
Qty: 56 TABLET | Refills: 0 | Status: SHIPPED | OUTPATIENT
Start: 2017-06-05 | End: 2017-06-28

## 2017-06-05 NOTE — TELEPHONE ENCOUNTER
Significant pansinusitis on CT.  Will treat with 4 weeks of Augmentin.  Rx sent in to her pharmacy.   Recommend f/u with Dr. Roa to discuss possible surgical options.

## 2017-06-05 NOTE — TELEPHONE ENCOUNTER
Spoke with pt regarding recommendations.  States she already spoke with Betsy Ferrell PA-C.  Tried to schedule appt with Dr. Roa.  Next available is on 6/16/17 [place].  Requested to see another MD, advised her that Dr. Roa is our MD that specializes in the sinus surgeries and if she saw Dr. Carson she would only be referred back to Dr. Rao.  Pt states she will have to call us back.

## 2017-06-06 ENCOUNTER — TELEPHONE (OUTPATIENT)
Dept: RHEUMATOLOGY | Facility: CLINIC | Age: 32
End: 2017-06-06

## 2017-06-06 ENCOUNTER — PATIENT MESSAGE (OUTPATIENT)
Dept: RHEUMATOLOGY | Facility: CLINIC | Age: 32
End: 2017-06-06

## 2017-06-06 NOTE — TELEPHONE ENCOUNTER
----- Message from Brodie Turner LPN sent at 6/6/2017  8:16 AM CDT -----  Regarding: Notification of Unviewed Test Results  Contact: [phone]  Entered by Freddy Maza MD at 6/2/2017  7:41 AM  Stable labs. So far no evidence of lupus activity.

## 2017-06-06 NOTE — TELEPHONE ENCOUNTER
,   I reviewed the CT scan. It looks like you have acute sinusitis of multiple sinuses and I agree with their recommendation to see .  He is the best ENT surgeon in [facility]. I would recommend you to see him. And, stop Cellcept until the infection is resolved. Continue plaquenil.   Regards

## 2017-06-06 NOTE — TELEPHONE ENCOUNTER
Advised patient of below, verbalized understanding. States she sent a [employer] message about being on ABX for 4 more weeks since she has already been on them for 1 week and that will bring her to a total of 5 weeks on ABX. Can send [employer] message back. Please Advise.

## 2017-06-12 ENCOUNTER — TELEPHONE (OUTPATIENT)
Dept: UROLOGY | Facility: CLINIC | Age: 32
End: 2017-06-12

## 2017-06-13 ENCOUNTER — CLINICAL SUPPORT (OUTPATIENT)
Dept: UROLOGY | Facility: CLINIC | Age: 32
End: 2017-06-13
Payer: COMMERCIAL

## 2017-06-13 ENCOUNTER — TELEPHONE (OUTPATIENT)
Dept: UROLOGY | Facility: CLINIC | Age: 32
End: 2017-06-13

## 2017-06-13 ENCOUNTER — LAB VISIT (OUTPATIENT)
Dept: LAB | Facility: HOSPITAL | Age: 32
End: 2017-06-13
Attending: INTERNAL MEDICINE
Payer: COMMERCIAL

## 2017-06-13 DIAGNOSIS — R31.9 HEMATURIA: Primary | ICD-10-CM

## 2017-06-13 DIAGNOSIS — M32.14 LUPUS NEPHRITIS: ICD-10-CM

## 2017-06-13 LAB
BACTERIA #/AREA URNS AUTO: ABNORMAL /HPF
BILIRUB UR QL STRIP: NEGATIVE
CLARITY UR REFRACT.AUTO: CLEAR
COLOR UR AUTO: YELLOW
CREAT UR-MCNC: 147 MG/DL
GLUCOSE UR QL STRIP: NEGATIVE
HGB UR QL STRIP: ABNORMAL
HYALINE CASTS UR QL AUTO: 0 /LPF
KETONES UR QL STRIP: NEGATIVE
LEUKOCYTE ESTERASE UR QL STRIP: NEGATIVE
MICROSCOPIC COMMENT: ABNORMAL
MICROSCOPIC COMMENT: ABNORMAL
NITRITE UR QL STRIP: NEGATIVE
PH UR STRIP: 5 [PH] (ref 5–8)
PROT UR QL STRIP: ABNORMAL
PROT UR-MCNC: 28 MG/DL
PROT/CREAT RATIO, UR: 0.19
RBC #/AREA URNS AUTO: 5 /HPF (ref 0–4)
RBC #/AREA URNS AUTO: 5 /HPF (ref 0–4)
SP GR UR STRIP: 1.02 (ref 1–1.03)
SQUAMOUS #/AREA URNS AUTO: 0 /HPF
URN SPEC COLLECT METH UR: ABNORMAL
UROBILINOGEN UR STRIP-ACNC: NEGATIVE EU/DL
WBC #/AREA URNS AUTO: 0 /HPF (ref 0–5)
WBC #/AREA URNS AUTO: 1 /HPF (ref 0–5)

## 2017-06-13 PROCEDURE — 81001 URINALYSIS AUTO W/SCOPE: CPT | Mod: 91

## 2017-06-13 PROCEDURE — 84156 ASSAY OF PROTEIN URINE: CPT

## 2017-06-13 PROCEDURE — 87086 URINE CULTURE/COLONY COUNT: CPT

## 2017-06-13 PROCEDURE — 99999 PR PBB SHADOW E&M-EST. PATIENT-LVL III: CPT | Mod: PBBFAC,,,

## 2017-06-13 PROCEDURE — 81001 URINALYSIS AUTO W/SCOPE: CPT

## 2017-06-14 ENCOUNTER — TELEPHONE (OUTPATIENT)
Dept: UROLOGY | Facility: CLINIC | Age: 32
End: 2017-06-14

## 2017-06-14 NOTE — TELEPHONE ENCOUNTER
Attempted to call pt to schedule hematuria workup--labs, CT urogram and cysto--no answer.  Msg left to call back to schedule.

## 2017-06-15 LAB — BACTERIA UR CULT: NO GROWTH

## 2017-06-19 ENCOUNTER — TELEPHONE (OUTPATIENT)
Dept: UROLOGY | Facility: CLINIC | Age: 32
End: 2017-06-19

## 2017-06-19 NOTE — TELEPHONE ENCOUNTER
Attempted to contact pt again to schedule CT and cysto; no answer.  Msg left on voice mail.  Test scheduled and mailed to pt along with a letter.

## 2017-06-20 ENCOUNTER — TELEPHONE (OUTPATIENT)
Dept: OTOLARYNGOLOGY | Facility: CLINIC | Age: 32
End: 2017-06-20

## 2017-06-20 NOTE — TELEPHONE ENCOUNTER
Phoned patient back and patient needs to reschedule her appointment on June 27 th due to Dr. Roa not being in clinic, patient was not able to wait so she stated she would call back.

## 2017-06-20 NOTE — TELEPHONE ENCOUNTER
----- Message from Simi Simon sent at 6/20/2017  2:33 PM CDT -----  Contact: Patient  Patient is returning a call, please call them back at [phone], patient states its urgent. Thank you

## 2017-06-20 NOTE — TELEPHONE ENCOUNTER
----- Message from Megan Lara sent at 6/20/2017  3:28 PM CDT -----  Contact: pt  States she needs to speak to the nurse, pt wouldn't say what it was regarding.  Please call pt at [phone]. Thank you

## 2017-06-20 NOTE — TELEPHONE ENCOUNTER
Attempted to reach patient to reschedule her appointment per patient request but no answer, left a message with a call back number.

## 2017-06-20 NOTE — TELEPHONE ENCOUNTER
Left message for a returned call back to reschedule appointment on 6/27/17 due to schedule juan carlos change.

## 2017-06-28 ENCOUNTER — OFFICE VISIT (OUTPATIENT)
Dept: OTOLARYNGOLOGY | Facility: CLINIC | Age: 32
End: 2017-06-28
Payer: COMMERCIAL

## 2017-06-28 VITALS — BODY MASS INDEX: 39.7 KG/M2 | WEIGHT: 203.25 LBS

## 2017-06-28 DIAGNOSIS — J32.4 CHRONIC PANSINUSITIS: Primary | ICD-10-CM

## 2017-06-28 DIAGNOSIS — J30.89 ALLERGIC RHINITIS DUE TO OTHER ALLERGIC TRIGGER, UNSPECIFIED RHINITIS SEASONALITY: ICD-10-CM

## 2017-06-28 DIAGNOSIS — J30.9 ALLERGIC RHINITIS DUE TO ALLERGEN: ICD-10-CM

## 2017-06-28 PROCEDURE — 99999 PR PBB SHADOW E&M-EST. PATIENT-LVL III: CPT | Mod: PBBFAC,,, | Performed by: OTOLARYNGOLOGY

## 2017-06-28 PROCEDURE — 99214 OFFICE O/P EST MOD 30 MIN: CPT | Mod: S$GLB,,, | Performed by: OTOLARYNGOLOGY

## 2017-06-28 RX ORDER — METRONIDAZOLE 500 MG/1
500 TABLET ORAL EVERY 12 HOURS
Qty: 28 TABLET | Refills: 0 | Status: SHIPPED | OUTPATIENT
Start: 2017-06-28 | End: 2017-07-12

## 2017-06-28 RX ORDER — LEVOFLOXACIN 500 MG/1
500 TABLET, FILM COATED ORAL DAILY
Qty: 21 TABLET | Refills: 0 | Status: SHIPPED | OUTPATIENT
Start: 2017-06-28 | End: 2017-07-28

## 2017-06-28 RX ORDER — FLUCONAZOLE 100 MG/1
100 TABLET ORAL DAILY
Qty: 30 TABLET | Refills: 0 | Status: SHIPPED | OUTPATIENT
Start: 2017-06-28 | End: 2017-07-28

## 2017-06-28 NOTE — PATIENT INSTRUCTIONS
Antibiotics:  Levaquin (levofloxacin) 30 days  Flagyl (metronidazole) 14 days    Allergy testing via Labs next week    Sinus Xray after finishing antibiotics    If you feel well and xray looks good we're done  If you feel bad and xray still shows significant infection we will do surgery    If it is unclear, we will repeat the CT after the xray    Keep using the budesonide nasal rinse

## 2017-06-30 ENCOUNTER — LAB VISIT (OUTPATIENT)
Dept: LAB | Facility: HOSPITAL | Age: 32
End: 2017-06-30
Attending: OTOLARYNGOLOGY
Payer: COMMERCIAL

## 2017-06-30 ENCOUNTER — OFFICE VISIT (OUTPATIENT)
Dept: OBSTETRICS AND GYNECOLOGY | Facility: CLINIC | Age: 32
End: 2017-06-30
Payer: COMMERCIAL

## 2017-06-30 VITALS
WEIGHT: 205.25 LBS | SYSTOLIC BLOOD PRESSURE: 144 MMHG | HEIGHT: 60 IN | BODY MASS INDEX: 40.3 KG/M2 | DIASTOLIC BLOOD PRESSURE: 82 MMHG

## 2017-06-30 DIAGNOSIS — J32.4 CHRONIC PANSINUSITIS: ICD-10-CM

## 2017-06-30 DIAGNOSIS — J30.89 ALLERGIC RHINITIS DUE TO OTHER ALLERGIC TRIGGER, UNSPECIFIED RHINITIS SEASONALITY: ICD-10-CM

## 2017-06-30 DIAGNOSIS — Z11.3 SCREEN FOR STD (SEXUALLY TRANSMITTED DISEASE): Primary | ICD-10-CM

## 2017-06-30 DIAGNOSIS — Z11.3 SCREEN FOR STD (SEXUALLY TRANSMITTED DISEASE): ICD-10-CM

## 2017-06-30 LAB
BASOPHILS # BLD AUTO: 0.03 K/UL
BASOPHILS NFR BLD: 0.6 %
DIFFERENTIAL METHOD: ABNORMAL
EOSINOPHIL # BLD AUTO: 0.2 K/UL
EOSINOPHIL NFR BLD: 4.5 %
ERYTHROCYTE [DISTWIDTH] IN BLOOD BY AUTOMATED COUNT: 15.1 %
HCT VFR BLD AUTO: 35.4 %
HGB BLD-MCNC: 11.5 G/DL
LYMPHOCYTES # BLD AUTO: 2.2 K/UL
LYMPHOCYTES NFR BLD: 45.6 %
MCH RBC QN AUTO: 29.9 PG
MCHC RBC AUTO-ENTMCNC: 32.5 %
MCV RBC AUTO: 92 FL
MONOCYTES # BLD AUTO: 0.4 K/UL
MONOCYTES NFR BLD: 8.6 %
NEUTROPHILS # BLD AUTO: 2 K/UL
NEUTROPHILS NFR BLD: 40.5 %
PLATELET # BLD AUTO: 212 K/UL
PMV BLD AUTO: 10.9 FL
RBC # BLD AUTO: 3.84 M/UL
WBC # BLD AUTO: 4.87 K/UL

## 2017-06-30 PROCEDURE — 85025 COMPLETE CBC W/AUTO DIFF WBC: CPT

## 2017-06-30 PROCEDURE — 86003 ALLG SPEC IGE CRUDE XTRC EA: CPT | Mod: 59

## 2017-06-30 PROCEDURE — 86703 HIV-1/HIV-2 1 RESULT ANTBDY: CPT

## 2017-06-30 PROCEDURE — 99999 PR PBB SHADOW E&M-EST. PATIENT-LVL IV: CPT | Mod: PBBFAC,,, | Performed by: OBSTETRICS & GYNECOLOGY

## 2017-06-30 PROCEDURE — 86592 SYPHILIS TEST NON-TREP QUAL: CPT

## 2017-06-30 PROCEDURE — 36415 COLL VENOUS BLD VENIPUNCTURE: CPT | Mod: PO

## 2017-06-30 PROCEDURE — 87340 HEPATITIS B SURFACE AG IA: CPT

## 2017-06-30 PROCEDURE — 86003 ALLG SPEC IGE CRUDE XTRC EA: CPT

## 2017-06-30 PROCEDURE — 99395 PREV VISIT EST AGE 18-39: CPT | Mod: S$GLB,,, | Performed by: OBSTETRICS & GYNECOLOGY

## 2017-06-30 RX ORDER — BUDESONIDE 0.5 MG/2ML
INHALANT ORAL
COMMUNITY
Start: 2017-06-07

## 2017-06-30 RX ORDER — PREDNISONE 10 MG/1
TABLET ORAL
Refills: 0 | COMMUNITY
Start: 2017-06-07 | End: 2017-06-30

## 2017-06-30 RX ORDER — BENZONATATE 200 MG/1
CAPSULE ORAL
Refills: 0 | COMMUNITY
Start: 2017-05-30 | End: 2017-06-30

## 2017-07-01 LAB — RPR SER QL: NORMAL

## 2017-07-03 LAB
HBV SURFACE AG SERPL QL IA: NEGATIVE
HIV 1+2 AB+HIV1 P24 AG SERPL QL IA: NEGATIVE

## 2017-07-05 LAB
AMER SYCAMORE IGE QN: <0.35 KU/L
FEATHER PANEL #2: <0.35 KU/L

## 2017-07-07 ENCOUNTER — TELEPHONE (OUTPATIENT)
Dept: OTOLARYNGOLOGY | Facility: CLINIC | Age: 32
End: 2017-07-07

## 2017-07-07 NOTE — TELEPHONE ENCOUNTER
----- Message from French Roa MD sent at 7/6/2017 11:29 PM CDT -----  Please call patient with normal results-  No allergies detected.

## 2017-07-07 NOTE — TELEPHONE ENCOUNTER
Phoned patient and made patient aware of her blood work results showing no allergies. Patient was happy with the call but would like to know what Dr. Roa would recommend her take for her allergy flare ups. I will get with provider and call patient back. Patient states she is currently taking Zyrtec at night and it has been working well for her but she wants to make sure with provider.

## 2017-07-10 NOTE — TELEPHONE ENCOUNTER
Phoned patient back and let patient know that Dr. Roa wants her to continue on the Zyrtec. Patient was happy with the call and the call ended well.

## 2017-07-14 LAB
A ALTERNATA IGE QN: <0.35 KU/L
A FUMIGATUS IGE QN: <0.35 KU/L
ALLERGEN BOXELDER MAPLE TREE IGE: <0.35 KU/L
ALLERGEN MAPLE (BOX ELDER) CLASS: NORMAL
ALLERGEN MULBERRY CLASS: NORMAL
ALLERGEN MULBERRY TREE IGE: <0.35 KU/L
ALLERGEN PENICILLIUM IGE: <0.35 KU/L
ALLERGEN WHITE ASH TREE IGE: <0.35 KU/L
BALD CYPRESS IGE QN: <0.35 KU/L
BERMUDA GRASS IGE QN: <0.35 KU/L
C HERBARUM IGE QN: <0.35 KU/L
CAT DANDER IGE QN: <0.35 KU/L
CEDAR IGE QN: <0.35 KU/L
CMN PIGWEED IGE QN: <0.35 KU/L
COCKLEBUR IGE QN: <0.35 KU/L
COMMON RAGWEED IGE QN: <0.35 KU/L
D FARINAE IGE QN: <0.35 KU/L
D PTERONYSS IGE QN: <0.35 KU/L
DEPRECATED A ALTERNATA IGE RAST QL: NORMAL
DEPRECATED A FUMIGATUS IGE RAST QL: NORMAL
DEPRECATED BALD CYPRESS IGE RAST QL: NORMAL
DEPRECATED BERMUDA GRASS IGE RAST QL: NORMAL
DEPRECATED C HERBARUM IGE RAST QL: NORMAL
DEPRECATED CAT DANDER IGE RAST QL: NORMAL
DEPRECATED CEDAR IGE RAST QL: NORMAL
DEPRECATED COCKLEBUR IGE RAST QL: NORMAL
DEPRECATED COMMON PIGWEED IGE RAST QL: NORMAL
DEPRECATED COMMON RAGWEED IGE RAST QL: NORMAL
DEPRECATED D FARINAE IGE RAST QL: NORMAL
DEPRECATED D PTERONYSS IGE RAST QL: NORMAL
DEPRECATED DOG DANDER IGE RAST QL: NORMAL
DEPRECATED ENGL PLANTAIN IGE RAST QL: NORMAL
DEPRECATED GOOSEFOOT IGE RAST QL: NORMAL
DEPRECATED JOHNSON GRASS IGE RAST QL: NORMAL
DEPRECATED KENT BLUE GRASS IGE RAST QL: NORMAL
DEPRECATED M RACEMOSUS IGE RAST QL: NORMAL
DEPRECATED MARSH ELDER IGE RAST QL: NORMAL
DEPRECATED MUGWORT IGE RAST QL: NORMAL
DEPRECATED NETTLE IGE RAST QL: NORMAL
DEPRECATED PECAN/HICK TREE IGE RAST QL: NORMAL
DEPRECATED ROACH IGE RAST QL: NORMAL
DEPRECATED SHEEP SORREL IGE RAST QL: NORMAL
DEPRECATED SILVER BIRCH IGE RAST QL: NORMAL
DEPRECATED TIMOTHY IGE RAST QL: NORMAL
DEPRECATED WHITE OAK IGE RAST QL: NORMAL
DOG DANDER IGE QN: <0.35 KU/L
ELM CEDAR CLASS: NORMAL
ELM CEDAR, IGE: <0.35 KU/L
ENGL PLANTAIN IGE QN: <0.35 KU/L
GOOSEFOOT IGE QN: <0.35 KU/L
JOHNSON GRASS IGE QN: <0.35 KU/L
KENT BLUE GRASS IGE QN: <0.35 KU/L
M RACEMOSUS IGE QN: <0.35 KU/L
MARSH ELDER IGE QN: <0.35 KU/L
MUGWORT IGE QN: <0.35 KU/L
NETTLE IGE QN: <0.35 KU/L
PECAN/HICK TREE IGE QN: <0.35 KU/L
PENICILLIUM CLASS: NORMAL
ROACH IGE QN: <0.35 KU/L
SHEEP SORREL IGE QN: <0.35 KU/L
SILVER BIRCH IGE QN: <0.35 KU/L
TIMOTHY IGE QN: <0.35 KU/L
WHITE ASH CLASS: NORMAL
WHITE OAK IGE QN: <0.35 KU/L

## 2017-07-17 ENCOUNTER — OFFICE VISIT (OUTPATIENT)
Dept: INTERNAL MEDICINE | Facility: CLINIC | Age: 32
End: 2017-07-17
Payer: COMMERCIAL

## 2017-07-17 VITALS
WEIGHT: 203.5 LBS | SYSTOLIC BLOOD PRESSURE: 119 MMHG | RESPIRATION RATE: 18 BRPM | BODY MASS INDEX: 39.95 KG/M2 | HEIGHT: 60 IN | OXYGEN SATURATION: 99 % | DIASTOLIC BLOOD PRESSURE: 68 MMHG | TEMPERATURE: 98 F | HEART RATE: 92 BPM

## 2017-07-17 DIAGNOSIS — L02.91 ABSCESS: Primary | ICD-10-CM

## 2017-07-17 DIAGNOSIS — L03.113 CELLULITIS OF RIGHT UPPER EXTREMITY: ICD-10-CM

## 2017-07-17 PROCEDURE — 99213 OFFICE O/P EST LOW 20 MIN: CPT | Mod: S$GLB,,, | Performed by: INTERNAL MEDICINE

## 2017-07-17 PROCEDURE — 99999 PR PBB SHADOW E&M-EST. PATIENT-LVL III: CPT | Mod: PBBFAC,,, | Performed by: INTERNAL MEDICINE

## 2017-07-17 RX ORDER — LIDOCAINE HYDROCHLORIDE AND EPINEPHRINE 10; 10 MG/ML; UG/ML
1 INJECTION, SOLUTION INFILTRATION; PERINEURAL ONCE
Status: SHIPPED | OUTPATIENT
Start: 2017-07-17

## 2017-07-17 RX ORDER — SULFAMETHOXAZOLE AND TRIMETHOPRIM 800; 160 MG/1; MG/1
1 TABLET ORAL 2 TIMES DAILY
Qty: 14 TABLET | Refills: 0 | Status: SHIPPED | OUTPATIENT
Start: 2017-07-17 | End: 2017-07-24

## 2017-07-17 NOTE — PROGRESS NOTES
Subjective:      Patient ID: Marsha Marina is a 31 y.o. female.    Chief Complaint: Insect Bite      HPI  Ms. Marina is a patient of Sonia Castorena DO, who presents episodically due to insect bite. She reports not seeing a tick or spider, but waking up from sleep from pain on her left wrist 1 day ago, while she was in [place]. No f/c.     Past Medical History:   Diagnosis Date    Abnormal Pap smear of vagina     10 years ago//had colpo with normal results//    Asthma     Disorder of kidney and ureter     Genital herpes     Hypertension     SLE (systemic lupus erythematosus)      Past Surgical History:   Procedure Laterality Date    CYST REMOVAL      on back    Gastric sleeve      RENAL BIOPSY  4/4/16    WISDOM TOOTH EXTRACTION       Social History     Social History    Marital status: Single     Spouse name: N/A    Number of children: N/A    Years of education: N/A     Occupational History    Not on file.     Social History Main Topics    Smoking status: Never Smoker    Smokeless tobacco: Never Used    Alcohol use No    Drug use: No    Sexual activity: Yes     Partners: Male     Birth control/ protection: None, Condom     Other Topics Concern    Not on file     Social History Narrative    No narrative on file     Family History   Problem Relation Age of Onset    Asthma Mother     Hyperlipidemia Father     Asthma Sister     Rashes / Skin problems Sister     Hypertension Other     Stroke Other     Heart disease Other     Diabetes Other     Breast cancer Neg Hx     Colon cancer Neg Hx     Ovarian cancer Neg Hx        Current Outpatient Prescriptions:     budesonide (PULMICORT) 0.5 mg/2 mL nebulizer solution, , Disp: , Rfl:     CALCIUM CITRATE ORAL, Take 1 tablet by mouth once daily at 6am., Disp: , Rfl:     diltiaZEM (CARDIZEM CD) 360 MG 24 hr capsule, TAKE 1 CAPSULE(360 MG) BY MOUTH EVERY DAY, Disp: 30 capsule, Rfl: 11    ergocalciferol (ERGOCALCIFEROL) 50,000 unit  Cap, Take 1 capsule (50,000 Units total) by mouth every 7 days., Disp: 12 capsule, Rfl: 3    fluticasone (FLONASE) 50 mcg/actuation nasal spray, 2 sprays by Each Nare route once daily at 6am., Disp: , Rfl:     hydroxychloroquine (PLAQUENIL) 200 mg tablet, Take 1 tablet (200 mg total) by mouth 2 (two) times daily., Disp: 180 tablet, Rfl: 3    labetalol (NORMODYNE) 200 MG tablet, Take 1 tablet (200 mg total) by mouth every 12 (twelve) hours., Disp: 60 tablet, Rfl: 11    LACTOBACILLUS ACIDOPHILUS (PROBIOTIC ORAL), Take 1 tablet by mouth once daily at 6am., Disp: , Rfl:     levoFLOXacin (LEVAQUIN) 500 MG tablet, Take 1 tablet (500 mg total) by mouth once daily., Disp: 21 tablet, Rfl: 0    multivitamin capsule, Take 1 capsule by mouth once daily., Disp: , Rfl:     pantoprazole (PROTONIX) 40 MG tablet, Take 40 mg by mouth once daily. , Disp: , Rfl: 1    PROAIR HFA 90 mcg/actuation inhaler, Inhale 2 puffs into the lungs as needed., Disp: 18 g, Rfl: 0    valacyclovir (VALTREX) 500 MG tablet, Take 1 tablet (500 mg total) by mouth once daily., Disp: 30 tablet, Rfl: 11    fluconazole (DIFLUCAN) 100 MG tablet, Take 1 tablet (100 mg total) by mouth once daily., Disp: 30 tablet, Rfl: 0    mycophenolate (CELLCEPT) 500 mg Tab, Take 2 tablets (1,000 mg total) by mouth 3 (three) times daily., Disp: 180 tablet, Rfl: 6    sulfamethoxazole-trimethoprim 800-160mg (BACTRIM DS) 800-160 mg Tab, Take 1 tablet by mouth 2 (two) times daily., Disp: 14 tablet, Rfl: 0    Current Facility-Administered Medications:     lidocaine-EPINEPHrine 1%-1:100,000 injection 1 mL, 1 mL, Intradermal, Once, Jack Lazaro MD    Review of patient's allergies indicates:   Allergen Reactions    Aspartame Anaphylaxis    Codeine Nausea And Vomiting     Bad Headache    Morphine Nausea And Vomiting     Bad Headache     Review of Systems   Constitutional: Negative.     Objective:     /68 (BP Location: Left arm, Patient Position: Sitting, BP  "Method: Manual)   Pulse 92   Temp 97.8 °F (36.6 °C) (Tympanic)   Resp 18   Ht 5' (1.524 m)   Wt 92.3 kg (203 lb 7.8 oz)   LMP 06/14/2017   SpO2 99%   BMI 39.74 kg/m²     Physical Exam  GEN: A&O fully, NAD  PSYC: Normal affect  DERM: Left wrist with a 0.5 x 0.5 cm off-white colored blister on a 1" erythematous base, which is TTP    Procedure Type: Left wrist I&D    Risks and benefits of procedure were reviewed with patient, including pain, infection, and death  Time-out was performed prior to procedure with MA, including patient name, site and procedure  Left wrist prepped with betadine with sterile technique, including gloves and concentrically larger circles of sterilization  1 cc lidocaine 1% with epi using a 25 G 1" needle in a 10 mL syringe by advancing needle into multiple peripheral aspects of the blister  1 cc blood loss  1 cc pus extruded  Dried betadine wiped off with alcohol pad  Triple antibiotic ointment applied to the area  Band aid applied  Procedure tolerated well  Patient reports less pain with ambulation after procedure.       Lab Results   Component Value Date    WBC 4.87 06/30/2017    HGB 11.5 (L) 06/30/2017    HCT 35.4 (L) 06/30/2017     06/30/2017    CHOL 188 02/29/2016    TRIG 93 02/29/2016    HDL 58 02/29/2016    ALT 11 06/01/2017    AST 19 06/01/2017     06/13/2017    K 3.3 (L) 06/13/2017     06/13/2017    CREATININE 0.9 06/13/2017    BUN 14 06/13/2017    CO2 19 (L) 06/13/2017    TSH 0.940 02/29/2016    INR 1.1 04/03/2016    HGBA1C 5.4 02/29/2016       Assessment:      1. Abscess: S/p I&D, which she tolerated well.    2. Cellulitis of right upper extremity: She is currently taking levofloxacin and metronidazole. Will add Bactrim DS to cover MRSA.     Plan:   Abscess  -     lidocaine-EPINEPHrine 1%-1:100,000 injection 1 mL; Inject 1 mL into the skin once.    Cellulitis of right upper extremity  -     sulfamethoxazole-trimethoprim 800-160mg (BACTRIM DS) 800-160 mg " Tab; Take 1 tablet by mouth 2 (two) times daily.  Dispense: 14 tablet; Refill: 0      RTC prn

## 2017-07-17 NOTE — PROGRESS NOTES
"Subjective:       Marsha Marina is a 31 y.o. female here for a routine exam.  Current complaints: desires annual exam with STD screening .  Personal health questionnaire reviewed: yes.    Patient also requesting to have labial tag removed.   On no contraception as all hormonal preparations cause her to have unacceptable symptoms.   Has not had sexual relationship "in a while".  Uses condoms for contraception.  COCP's contraindicated due to HTN.      Gynecologic History  Patient's last menstrual period was 2017 (exact date).  Contraception: condoms  Last Pap: . Results were: normal  Last mammogram: NA. Results were: MA/     Obstetric History  OB History    Para Term  AB Living   0 0 0 0 0 0   SAB TAB Ectopic Multiple Live Births   0 0 0 0                 The following portions of the patient's history were reviewed and updated as appropriate: allergies, current medications, past family history, past medical history, past social history, past surgical history and problem list.    Review of Systems  Pertinent items are noted in HPI.      Objective:        BP (!) 144/82   Ht 5' (1.524 m)   Wt 93.1 kg (205 lb 4 oz)   LMP 2017 (Exact Date)   BMI 40.08 kg/m²     General Appearance:    Alert, cooperative, no distress, appears stated age   Head:    Normocephalic, without obvious abnormality, atraumatic   Eyes:    PERRL, conjunctiva/corneas clear, EOM's intact, both eyes   Ears:    Normal  external ear canals, both ears   Nose:   Nares normal, septum midline, mucosa normal, no drainage    or sinus tenderness   Throat:   Lips, mucosa, and tongue normal; teeth and gums normal   Neck:   Supple, symmetrical, trachea midline, no adenopathy;     thyroid:  no enlargement/tenderness/nodules; no carotid    bruit or JVD   Back:     Symmetric, no curvature, ROM normal, no CVA tenderness   Lungs:     Clear to auscultation bilaterally, respirations unlabored   Chest Wall:    No tenderness or " deformity    Heart:    Regular rate and rhythm, S1 and S2 normal, no murmur, rub   or gallop   Breast Exam:    No tenderness, masses, or nipple abnormality   Abdomen:     Soft, non-tender, bowel sounds active all four quadrants,     no masses, no organomegaly   Genitalia:    Normal female without discharge.  Small 2 mm firm lesion on left labia majora without redness or tenderness. Cervix midline and w/o CMT. Uterus mobile, firm, small and non-tender. No adnexal masses or tenderness with bilateral exam.   Rectal:    Deferred/    Extremities:   Extremities normal, atraumatic, no cyanosis or edema   Pulses:   2+ and symmetric all extremities   Skin:   Skin color, texture, turgor normal, no rashes or lesions   Lymph nodes:   Cervical, supraclavicular, and axillary nodes normal   Neurologic:   normal strength, sensation and reflexes     Throughout/            Assessment:      Healthy female exam.     Desires STD screening.   Left labial lesion.      Plan:     1. Annual exams to continue yearly .  Reviewed current [place] Guidelines for pap smear screening q 3 years or more frequent if abnormal results occur.   2. Reviewed safe sex guidelines and continued use of condoms for contraception.   3. Serology for STD screening with urine specimen for GC/chlamy screening.   4. Refer to dermatology for removal of skin lesion.  5. Annual exam one year.

## 2017-07-18 ENCOUNTER — PATIENT MESSAGE (OUTPATIENT)
Dept: OTOLARYNGOLOGY | Facility: CLINIC | Age: 32
End: 2017-07-18

## 2017-07-18 ENCOUNTER — PATIENT MESSAGE (OUTPATIENT)
Dept: INTERNAL MEDICINE | Facility: CLINIC | Age: 32
End: 2017-07-18

## 2017-07-18 NOTE — PROGRESS NOTES
Subjective:       Patient ID: Marsha Marina is a 31 y.o. female.    Chief Complaint: Other (Would like to discuss possible tonsillectomy & adenoidectomy)    Patient is a very pleasant 31-year-old female here to see me today for the first time for evaluation of recurrent sinusitis.  She was last here in 6/2015 with Dr. Carson for evaluation of recurrent tonsil stones as well as enlarged tonsils and they had discussed tonsillectomy at that time. She has a history of Lupus and is on Plaquenil and has recently been treated with 5 courses of antibiotics since January 2017 for sinusitis and URI and has not had sustained improvement.  She's been treated with Augmentin twice, Cefdinir, and Zithromax, as well as a steroid Dosepak.  She's currently complaining of significant sinus pressure, mid facial pain, congestion, and postnasal drainage.  She says her cough is improving.   She reports difficulty breathing through the nose bilaterally.  She has never had allergy testing performed.  She has had a CT scan fairly recently of the sinuses.  She was evaluated by another ENT who recommended rinsing the nose with budesonide.  She states this has helped with some of the pressure and congestion.      Review of Systems:  -     Allergic/Immunologic: is allergic to aspartame; codeine; and morphine..  -     Constitutional: Current temp:            Objective:      Physical Exam   Constitutional: She is oriented to person, place, and time. She appears well-developed and well-nourished. No distress.   HENT:   Head: Normocephalic and atraumatic.   Right Ear: Tympanic membrane, external ear and ear canal normal.   Left Ear: Tympanic membrane, external ear and ear canal normal.   Nose: Mucosal edema and rhinorrhea present. No nasal deformity or septal deviation. No epistaxis. Right sinus exhibits maxillary sinus tenderness and frontal sinus tenderness. Left sinus exhibits maxillary sinus tenderness and frontal sinus tenderness.    Mouth/Throat: Uvula is midline, oropharynx is clear and moist and mucous membranes are normal. Mucous membranes are not pale and not dry. Normal dentition. No oropharyngeal exudate or posterior oropharyngeal erythema. Tonsils are 2+ on the right. Tonsils are 2+ on the left. No tonsillar exudate (no tonsiliths visualized today).   Eyes: Conjunctivae, EOM and lids are normal. Pupils are equal, round, and reactive to light. Right eye exhibits no chemosis. Left eye exhibits no chemosis. Right conjunctiva is not injected. Left conjunctiva is not injected. No scleral icterus. Right eye exhibits normal extraocular motion and no nystagmus. Left eye exhibits normal extraocular motion and no nystagmus.   Neck: Trachea normal and phonation normal. No tracheal tenderness present. No tracheal deviation present. No thyroid mass and no thyromegaly present.   Cardiovascular: Intact distal pulses.    Pulmonary/Chest: Effort normal. No stridor. No respiratory distress.   Abdominal: She exhibits no distension.   Lymphadenopathy:        Head (right side): No submental, no submandibular, no preauricular, no posterior auricular and no occipital adenopathy present.        Head (left side): No submental, no submandibular, no preauricular, no posterior auricular and no occipital adenopathy present.     She has no cervical adenopathy.   Neurological: She is alert and oriented to person, place, and time. No cranial nerve deficit.   Skin: Skin is warm and dry. No rash noted. No erythema.   Psychiatric: She has a normal mood and affect. Her behavior is normal.       CT sinus June 6, 2017 independently reviewed          Assessment:       1. Chronic pansinusitis   2. Chronic rhinitis- allergic versus autoimmune mediated               Plan:       -SINUSITIS/RHINITIS  Marsha presents today for initial evaluation.  They have multiple sinonasal complaints and determination of the underlying etiology is a problem of moderate to high complexity.   Patients may present with sinonasal symptoms such as nasal obstruction as a primary anatomic disorder.  Patients may also present with recurrent or chronic inflammatory sinonasal symptoms.  Generally, patients can be stratified into one of several groups.      The first group represents patients who have frequent or recurrent upper respiratory infections, most frequently viral.  These patients most frequently have normally functioning immune system's but have high levels of exposure.  This is commonly seen in nurses and schoolteachers as well as other groups who spend large amounts of time around sick patients and children.      Other patients may have isolated rhinitis without evidence of sinusitis.  The majority of these patients have ALLERGIC rhinitis however other groups may have more rare forms of rhinitis such as nonallergic rhinitis with eosinophilia syndrome.  As a screening evaluation, if the patient has had a normal endoscopy, from time to time a simple x-ray of the sinuses may be performed in combination with antigen specific ALLERGY testing.    The third group of patients present with significant evidence of chronic sinusitis.  Nasal endoscopy may reveal polyps or purulent drainage.  CT scan is an important aspect to determining the extent of disease.  Some patients with chronic sinusitis have an underlying etiology of ALLERGY leading to obstruction of the ostiomeatal units with furthering of the infection.  Others may have an acute infection that leads to stenosis of the sinonasal openings followed by a long, progressive course of infection.  Patients with unilateral disease who do not have inverting papilloma typically fall into this latter group.    CT scan of the sinuses has been performed.      Antigen specific allergy testing  has not been performed.    By review of all data, history and exam, there does not seem to be a clear distinction between allergic rhinitis versus rhinitis with a component  of chronic sinusitis.   My impression is that Marsha presents with chronic infection with severe inflammation.  Inflammatory pathway may be entirely infectious but cannot exclude allergy/autoimmune.     My recommendation is antigen specific RAST testing, 21 days of levofloxacin/14 days of metronidazole followed by plain films of the sinuses. Continue budesonide rinses.

## 2017-07-20 ENCOUNTER — HOSPITAL ENCOUNTER (OUTPATIENT)
Dept: RADIOLOGY | Facility: HOSPITAL | Age: 32
Discharge: HOME | End: 2017-07-20
Attending: UROLOGY
Payer: COMMERCIAL

## 2017-07-20 DIAGNOSIS — R31.9 HEMATURIA: ICD-10-CM

## 2017-07-20 PROCEDURE — 25500020 PHARM REV CODE 255: Performed by: UROLOGY

## 2017-07-20 PROCEDURE — 74178 CT ABD&PLV WO CNTR FLWD CNTR: CPT | Mod: TC

## 2017-07-20 RX ADMIN — IOHEXOL 75 ML: 350 INJECTION, SOLUTION INTRAVENOUS at 10:07

## 2017-08-01 ENCOUNTER — PATIENT OUTREACH (OUTPATIENT)
Dept: ADMINISTRATIVE | Facility: HOSPITAL | Age: 32
End: 2017-08-01

## 2017-08-02 ENCOUNTER — TELEPHONE (OUTPATIENT)
Dept: RHEUMATOLOGY | Facility: CLINIC | Age: 32
End: 2017-08-02

## 2017-08-03 ENCOUNTER — OFFICE VISIT (OUTPATIENT)
Dept: RHEUMATOLOGY | Facility: CLINIC | Age: 32
End: 2017-08-03
Payer: COMMERCIAL

## 2017-08-03 ENCOUNTER — HOSPITAL ENCOUNTER (OUTPATIENT)
Dept: RADIOLOGY | Facility: HOSPITAL | Age: 32
Discharge: HOME | End: 2017-08-03
Attending: OTOLARYNGOLOGY
Payer: COMMERCIAL

## 2017-08-03 ENCOUNTER — TELEPHONE (OUTPATIENT)
Dept: NEPHROLOGY | Facility: CLINIC | Age: 32
End: 2017-08-03

## 2017-08-03 ENCOUNTER — OFFICE VISIT (OUTPATIENT)
Dept: NEPHROLOGY | Facility: CLINIC | Age: 32
End: 2017-08-03
Payer: COMMERCIAL

## 2017-08-03 VITALS
HEART RATE: 82 BPM | BODY MASS INDEX: 36.92 KG/M2 | SYSTOLIC BLOOD PRESSURE: 126 MMHG | HEIGHT: 62 IN | DIASTOLIC BLOOD PRESSURE: 78 MMHG | WEIGHT: 200.63 LBS

## 2017-08-03 VITALS
SYSTOLIC BLOOD PRESSURE: 139 MMHG | BODY MASS INDEX: 39.91 KG/M2 | DIASTOLIC BLOOD PRESSURE: 93 MMHG | HEART RATE: 68 BPM | WEIGHT: 204.38 LBS

## 2017-08-03 DIAGNOSIS — M32.14 LUPUS NEPHRITIS: Primary | ICD-10-CM

## 2017-08-03 DIAGNOSIS — R80.9 PROTEINURIA, UNSPECIFIED TYPE: ICD-10-CM

## 2017-08-03 DIAGNOSIS — E66.9 OBESITY (BMI 30-39.9): ICD-10-CM

## 2017-08-03 DIAGNOSIS — N17.9 ACUTE RENAL FAILURE, UNSPECIFIED ACUTE RENAL FAILURE TYPE: Primary | ICD-10-CM

## 2017-08-03 DIAGNOSIS — M32.14 STAGE IV LUPUS NEPHRITIS (WHO): ICD-10-CM

## 2017-08-03 DIAGNOSIS — M32.9 SYSTEMIC LUPUS ERYTHEMATOSUS ARTHRITIS: Primary | ICD-10-CM

## 2017-08-03 DIAGNOSIS — J30.9 ALLERGIC RHINITIS DUE TO ALLERGEN: ICD-10-CM

## 2017-08-03 DIAGNOSIS — J32.4 CHRONIC PANSINUSITIS: ICD-10-CM

## 2017-08-03 PROCEDURE — 99999 PR PBB SHADOW E&M-EST. PATIENT-LVL II: CPT | Mod: PBBFAC,,, | Performed by: INTERNAL MEDICINE

## 2017-08-03 PROCEDURE — 70220 X-RAY EXAM OF SINUSES: CPT | Mod: TC,PO

## 2017-08-03 PROCEDURE — 3008F BODY MASS INDEX DOCD: CPT | Mod: S$GLB,,, | Performed by: INTERNAL MEDICINE

## 2017-08-03 PROCEDURE — 99999 PR PBB SHADOW E&M-EST. PATIENT-LVL III: CPT | Mod: PBBFAC,,, | Performed by: INTERNAL MEDICINE

## 2017-08-03 PROCEDURE — 99214 OFFICE O/P EST MOD 30 MIN: CPT | Mod: S$GLB,,, | Performed by: INTERNAL MEDICINE

## 2017-08-03 PROCEDURE — 70220 X-RAY EXAM OF SINUSES: CPT | Mod: 26,,, | Performed by: RADIOLOGY

## 2017-08-03 RX ORDER — FLUCONAZOLE 100 MG/1
100 TABLET ORAL DAILY
COMMUNITY
End: 2018-02-28 | Stop reason: ALTCHOICE

## 2017-08-03 NOTE — TELEPHONE ENCOUNTER
Returned call to patient. Informed her that she need to do labs before her appointment. She states that she will come in for 2:30. She has been scheduled

## 2017-08-03 NOTE — PROGRESS NOTES
RHEUMATOLOGY CLINIC FOLLOW UP VISIT  Chief complaints:-  To follow up for lupus.     HPI:-  Marsha Serrano a 31 y.o. pleasant female comes in for a follow up visit with above chief complaints. She has complex PMH including hypertensive encephalopathy and lupus nephritis. She is on  Plaquenil for lupus. Her Cellcept is on hold because of treatment resistant .  Today she reports doing well except the treatment resistant sinusitis for which  have mentioned surgical treatment if the infection fails to respond to present therapy. She has been very stressed due to her work hours . Some pain over left elbow at night times , but no other joint pain / skin rash . No new symptoms.    Physical examination:-    Vitals:    08/03/17 1522   BP: (!) 139/93   Pulse: 68   Weight: 92.7 kg (204 lb 5.9 oz)   PainSc:   2   PainLoc: Foot       Physical Exam   Constitutional: She is oriented to person, place, and time and well-developed, well-nourished, and in no distress. No distress.   HENT:   Head: Normocephalic.   Mouth/Throat: Oropharynx is clear and moist.   Eyes: Conjunctivae and EOM are normal. Pupils are equal, round, and reactive to light. Right eye exhibits no discharge. Left eye exhibits no discharge. No scleral icterus.   Neck: Normal range of motion. Neck supple.   Cardiovascular: Normal rate and intact distal pulses.    Pulmonary/Chest: Effort normal. No respiratory distress.   Abdominal: Soft. There is no tenderness.   Musculoskeletal:   No synovitis over small joints.   Bilateral wrists nontender.  No effusion. No sclerodactyly or telangiectasias. No malar rash. Normal nail fold capillaries.    Lymphadenopathy:     She has no cervical adenopathy.   Neurological: She is alert and oriented to person, place, and time. No cranial nerve deficit.   Skin: Skin is warm. No rash noted. She is not diaphoretic. No erythema.   Psychiatric: Mood, affect and judgment normal.   Nursing note and vitals  reviewed.      Labs:-  Results for DICKSON ROMEO (MRN [id number]) as of 3/31/2017 16:04   Ref. Range 4/11/2016 14:35 5/9/2016 16:25 8/18/2016 15:00 11/9/2016 10:46   DEEP HEP-2 Titer Unknown Positive 1:640 Ho...      Anti-SSA Antibody Latest Ref Range: 0.00 - 19.99 EU 0.31      Anti-SSA Interpretation Latest Ref Range: Negative  Negative      Anti-SSB Antibody Latest Ref Range: 0.00 - 19.99 EU 0.00      Anti-SSB Interpretation Latest Ref Range: Negative  Negative      ds DNA Ab Latest Ref Range: Negative 1:10  Positive 1:80 (A)      Anti Sm Antibody Latest Ref Range: 0.00 - 19.99 EU 1.22      Anti-Sm Interpretation Latest Ref Range: Negative  Negative      Anti Sm/RNP Antibody Latest Ref Range: 0.00 - 19.99 EU 0.34      Anti-Sm/RNP Interpretation Latest Ref Range: Negative  Negative      Complement (C-3) Latest Ref Range: 50 - 180 mg/dL 55 98 123 130   Complement (C-4) Latest Ref Range: 11 - 44 mg/dL 6 (L) 17 21 25       Medication List with Changes/Refills   Current Medications    BUDESONIDE (PULMICORT) 0.5 MG/2 ML NEBULIZER SOLUTION        CALCIUM CITRATE ORAL    Take 1 tablet by mouth once daily at 6am.    DILTIAZEM (CARDIZEM CD) 360 MG 24 HR CAPSULE    TAKE 1 CAPSULE(360 MG) BY MOUTH EVERY DAY    ERGOCALCIFEROL (ERGOCALCIFEROL) 50,000 UNIT CAP    Take 1 capsule (50,000 Units total) by mouth every 7 days.    FLUCONAZOLE (DIFLUCAN) 100 MG TABLET    Take 100 mg by mouth once daily.    FLUTICASONE (FLONASE) 50 MCG/ACTUATION NASAL SPRAY    2 sprays by Each Nare route once daily at 6am.    HYDROXYCHLOROQUINE (PLAQUENIL) 200 MG TABLET    Take 1 tablet (200 mg total) by mouth 2 (two) times daily.    LABETALOL (NORMODYNE) 200 MG TABLET    Take 1 tablet (200 mg total) by mouth every 12 (twelve) hours.    LACTOBACILLUS ACIDOPHILUS (PROBIOTIC ORAL)    Take 1 tablet by mouth once daily at 6am.    MULTIVITAMIN CAPSULE    Take 1 capsule by mouth once daily.    MYCOPHENOLATE (CELLCEPT) 500 MG TAB    Take 2 tablets (1,000  mg total) by mouth 3 (three) times daily.    PANTOPRAZOLE (PROTONIX) 40 MG TABLET    Take 40 mg by mouth once daily.     PROAIR HFA 90 MCG/ACTUATION INHALER    Inhale 2 puffs into the lungs as needed.    VALACYCLOVIR (VALTREX) 500 MG TABLET    Take 1 tablet (500 mg total) by mouth once daily.       Assessment/Plans:-  # Stage IV and V lupus nephritis (WHO):-  Stable lupus nephritis with stable Urine P/C ratio, normal serum creatinine under treatment by Nehrologist . Continue plaquenil .     # obesity:-  Underwent gastric bypass. Have lost weight significantly since the surgery.     # Systemic lupus erythematosus arthritis:-  Stable. No synovitis on exam today. Monitor.     # Immunosuppression:-  High dose immunosuppressive therapy. Hold medications with any infection.       # Return in about 3 months (around 11/3/2017).      Time spent: 30 minutes in face to face discussion concerning diagnosis, prognosis, review of lab and test results, benefits of treatment as well as management of disease, counseling of patient and coordination of care between various health care providers . Greater than half the time spent was used for coordination of care and counseling of patient.      Disclaimer: This note was prepared using voice recognition system and is likely to have sound alike errors and is not proof read.  Please call me with any questions.

## 2017-08-03 NOTE — PROGRESS NOTES
PROGRESS NOTE FOR ESTABLISHED PATIENT    PHYSICIAN REQUESTING THE CONSULT: Dr. Sonia Castorena    REASON FOR CONSULTATION: Lupus nephritis    31 y.o. female with history of Lupus nephritis, HTN, proteinuria, SLE, asthma presents to the renal clinic for evaluation of renal insufficiency.     Patient was last seen by Dr. Avery on 1/16/17.    Patient today presents to renal clinic for follow up. She reports that her cellcept has been temporarily discontinued because she is being treated with Levaquin and Flagyl for sinusitis. Cellcept will be restarted once antibiotics have been completed. Patient also has follow up with Dr. Neff for hematuria. She has no complaints today.        Past Medical History:   Diagnosis Date    Abnormal Pap smear of vagina     10 years ago//had colpo with normal results//    Asthma     Disorder of kidney and ureter     Genital herpes     Hypertension     SLE (systemic lupus erythematosus)        Past Surgical History:   Procedure Laterality Date    CYST REMOVAL      on back    Gastric sleeve      RENAL BIOPSY  4/4/16    WISDOM TOOTH EXTRACTION         Review of patient's allergies indicates:   Allergen Reactions    Aspartame Anaphylaxis    Codeine Nausea And Vomiting     Bad Headache    Morphine Nausea And Vomiting     Bad Headache       Current Outpatient Prescriptions   Medication Sig Dispense Refill    budesonide (PULMICORT) 0.5 mg/2 mL nebulizer solution       CALCIUM CITRATE ORAL Take 1 tablet by mouth once daily at 6am.      diltiaZEM (CARDIZEM CD) 360 MG 24 hr capsule TAKE 1 CAPSULE(360 MG) BY MOUTH EVERY DAY 30 capsule 11    ergocalciferol (ERGOCALCIFEROL) 50,000 unit Cap Take 1 capsule (50,000 Units total) by mouth every 7 days. 12 capsule 3    fluconazole (DIFLUCAN) 100 MG tablet Take 100 mg by mouth once daily.      fluticasone (FLONASE) 50 mcg/actuation nasal spray 2 sprays by Each Nare route once daily at 6am.      hydroxychloroquine (PLAQUENIL) 200 mg  tablet Take 1 tablet (200 mg total) by mouth 2 (two) times daily. 180 tablet 3    labetalol (NORMODYNE) 200 MG tablet Take 1 tablet (200 mg total) by mouth every 12 (twelve) hours. 60 tablet 11    LACTOBACILLUS ACIDOPHILUS (PROBIOTIC ORAL) Take 1 tablet by mouth once daily at 6am.      multivitamin capsule Take 1 capsule by mouth once daily.      mycophenolate (CELLCEPT) 500 mg Tab Take 2 tablets (1,000 mg total) by mouth 3 (three) times daily. 180 tablet 6    pantoprazole (PROTONIX) 40 MG tablet Take 40 mg by mouth once daily.   1    PROAIR HFA 90 mcg/actuation inhaler Inhale 2 puffs into the lungs as needed. 18 g 0    valacyclovir (VALTREX) 500 MG tablet Take 1 tablet (500 mg total) by mouth once daily. 30 tablet 11     Current Facility-Administered Medications   Medication Dose Route Frequency Provider Last Rate Last Dose    lidocaine-EPINEPHrine 1%-1:100,000 injection 1 mL  1 mL Intradermal Once Jack Lazaro MD           Family History   Problem Relation Age of Onset    Asthma Mother     Hyperlipidemia Father     Asthma Sister     Rashes / Skin problems Sister     Hypertension Other     Stroke Other     Heart disease Other     Diabetes Other     Breast cancer Neg Hx     Colon cancer Neg Hx     Ovarian cancer Neg Hx        Social History     Social History    Marital status: Single     Spouse name: N/A    Number of children: N/A    Years of education: N/A     Occupational History    Not on file.     Social History Main Topics    Smoking status: Never Smoker    Smokeless tobacco: Never Used    Alcohol use No    Drug use: No    Sexual activity: Yes     Partners: Male     Birth control/ protection: None, Condom     Other Topics Concern    Not on file     Social History Narrative    No narrative on file       Review of Systems:  1. GENERAL: patient denies any fever, weight changes, generalized weakness, dizziness.  2. HEENT: patient denies headaches, visual disturbances, swallowing  "problems, sinus pain, nasal congestion.  3. CARDIOVASCULAR: patient denies chest pain, palpitations.  4. PULMONARY: patient denies SOB, coughing, hemoptysis, wheezing.  5. GASTROINTESTINAL: patient denies abdominal pain, nausea, vomiting, diarrhea.  6. GENITOURINARY: patient denies dysuria, hematuria, hesitancy, frequency.  7. EXTREMITIES: patient denies LE edema, LE cramping.  8. DERMATOLOGY: patient denies rashes, ulcers.  9. NEURO: patient denies tremors, extremity weakness, extremity numbness/tingling.  10. MUSCULOSKELETAL: patient denies joint pain, joint swelling.  11. HEMATOLOGY: patient denies rectal or gum bleeding.  12: PSYCH: patient denies anxiety, depression.      PHYSICAL EXAM:    /78   Pulse 82   Ht 5' 2" (1.575 m)   Wt 91 kg (200 lb 9.9 oz)   LMP 06/14/2017   BMI 36.69 kg/m²     GENERAL: Pleasant lady presents to clinic with non-labored breathing.  HEENT: PER, no nasal discharge, no icterus, no oral exudates, moist mucosal membranes.  NECK: no thyroid mass, no lymphadenopathy.  HEART: RRR S1/S2, no rubs, good peripheral pulses.  LUNGS: CTA bilaterally, no wheezing, breathing is nonlabored.  ABDOMEN: soft, nontender, not distended, bowel sounds are present, no abdominal hernia.  EXTREM: no LE edema.  SKIN: no rashes, skin is warm and dry.  NEURO: A & O x 3, no obvious focal signs.    LABORATORY RESULTS:    Lab Results   Component Value Date    CREATININE 1.0 08/03/2017    BUN 18 08/03/2017     08/03/2017    K 4.4 08/03/2017     08/03/2017    CO2 20 (L) 08/03/2017      Lab Results   Component Value Date    PTH 59.0 06/27/2016    CALCIUM 9.5 08/03/2017    PHOS 3.7 08/03/2017     Lab Results   Component Value Date    ALBUMIN 3.6 08/03/2017     Lab Results   Component Value Date    WBC 6.94 08/03/2017    HGB 12.5 08/03/2017    HCT 36.9 (L) 08/03/2017    MCV 90 08/03/2017     08/03/2017     Protein Creatinine Ratios:    Creatinine, Random Ur   Date Value Ref Range Status "   06/13/2017 147.0 15.0 - 325.0 mg/dL Final     Comment:     The random urine reference ranges provided were established   for 24 hour urine collections.  No reference ranges exist for  random urine specimens.  Correlate clinically.     04/25/2017 165.5 15.0 - 325.0 mg/dL Final     Comment:     The random urine reference ranges provided were established   for 24 hour urine collections.  No reference ranges exist for  random urine specimens.  Correlate clinically.     03/31/2017 132.0 15.0 - 325.0 mg/dL Final     Comment:     The random urine reference ranges provided were established   for 24 hour urine collections.  No reference ranges exist for  random urine specimens.  Correlate clinically.       Protein, Urine Random   Date Value Ref Range Status   06/13/2017 28 (H) 0 - 15 mg/dL Final     Comment:     The random urine reference ranges provided were established   for 24 hour urine collections.  No reference ranges exist for  random urine specimens.  Correlate clinically.     04/25/2017 40 (H) 0 - 15 mg/dL Final     Comment:     The random urine reference ranges provided were established   for 24 hour urine collections.  No reference ranges exist for  random urine specimens.  Correlate clinically.     03/31/2017 45 (H) 0 - 15 mg/dL Final     Comment:     The random urine reference ranges provided were established   for 24 hour urine collections.  No reference ranges exist for  random urine specimens.  Correlate clinically.       Prot/Creat Ratio, Ur   Date Value Ref Range Status   06/13/2017 0.19 0.00 - 0.20 Final   04/25/2017 0.24 (H) 0.00 - 0.20 Final   03/31/2017 0.34 (H) 0.00 - 0.20 Final           ASSESSMENT AND PLAN:  31 y.o. female with history of Lupus nephritis, HTN, proteinuria, SLE, asthma presents to the renal clinic for evaluation of renal insufficiency.     1. Renal insufficiency/Luspus nephritis: Patient presents with CKD stage 2. Creatinine has remained stable at 1. Protein creatinine ratio has  improved to 0.19 (from 0.5). Patient's renal function will be monitored closely and she will return to the clinic in 3 months for follow up.     2. Electrolytes: Within normal limits.    3. Acid base status: mild acidosis, monitor.     4. Volume: Euvolemic.     5. Hypertension: Good BP control.     6. Medications: Reviewed. Agree with current medical regimen. Avoid ACE-I/ARB because of associated YEYO in past.     7. SLE: as per Rheumatology. Cellcept will be restarted once antibiotic treatment for sinusitis has been completed.    8. Hematuria: Patient has follow up with Dr. Neff.

## 2017-08-07 ENCOUNTER — TELEPHONE (OUTPATIENT)
Dept: NEPHROLOGY | Facility: CLINIC | Age: 32
End: 2017-08-07

## 2017-08-07 PROBLEM — J01.00 SUBACUTE MAXILLARY SINUSITIS: Status: RESOLVED | Noted: 2017-05-08 | Resolved: 2017-08-07

## 2017-08-07 NOTE — TELEPHONE ENCOUNTER
Returned call to the lab. They could not run the test on patient's urine protein due to it being collected in the wrong container. Do you want patient to return to give another specimen or she can wait for her follow up?

## 2017-08-07 NOTE — TELEPHONE ENCOUNTER
----- Message from Stuart RAFIQ Rissa sent at 8/7/2017  9:52 AM CDT -----  Contact: Lab Client Services  Hi my name is Stuart I work in the lab Client Services. We had a problem with some lab work on this patient. If someone from your office could call us at [phone] or [phone] that would be great. Anyone in my department can help. Thank You.

## 2017-08-08 ENCOUNTER — TELEPHONE (OUTPATIENT)
Dept: OTOLARYNGOLOGY | Facility: CLINIC | Age: 32
End: 2017-08-08

## 2017-08-08 NOTE — TELEPHONE ENCOUNTER
Attempted to reach patient to give her results from her recent sinus images but no answer, left a message with a call back number.

## 2017-08-08 NOTE — TELEPHONE ENCOUNTER
----- Message from French Roa MD sent at 8/8/2017  9:21 AM CDT -----  Sinus images look good.  If she is not feeling significantly better, she needs to come to clinic to have a scope.

## 2017-08-09 ENCOUNTER — LAB VISIT (OUTPATIENT)
Dept: LAB | Facility: HOSPITAL | Age: 32
End: 2017-08-09
Attending: INTERNAL MEDICINE
Payer: COMMERCIAL

## 2017-08-09 ENCOUNTER — OFFICE VISIT (OUTPATIENT)
Dept: OTOLARYNGOLOGY | Facility: CLINIC | Age: 32
End: 2017-08-09
Payer: COMMERCIAL

## 2017-08-09 VITALS — WEIGHT: 203.25 LBS | BODY MASS INDEX: 37.18 KG/M2

## 2017-08-09 DIAGNOSIS — M32.14 LUPUS NEPHRITIS: ICD-10-CM

## 2017-08-09 DIAGNOSIS — R80.9 PROTEINURIA, UNSPECIFIED TYPE: ICD-10-CM

## 2017-08-09 DIAGNOSIS — J32.4 CHRONIC PANSINUSITIS: Primary | ICD-10-CM

## 2017-08-09 LAB
BACTERIA #/AREA URNS AUTO: ABNORMAL /HPF
BILIRUB UR QL STRIP: NEGATIVE
CLARITY UR REFRACT.AUTO: CLEAR
COLOR UR AUTO: YELLOW
CREAT UR-MCNC: 195 MG/DL
GLUCOSE UR QL STRIP: NEGATIVE
HGB UR QL STRIP: ABNORMAL
HYALINE CASTS UR QL AUTO: 0 /LPF
KETONES UR QL STRIP: NEGATIVE
LEUKOCYTE ESTERASE UR QL STRIP: NEGATIVE
MICROSCOPIC COMMENT: ABNORMAL
NITRITE UR QL STRIP: NEGATIVE
PH UR STRIP: 5 [PH] (ref 5–8)
PROT UR QL STRIP: ABNORMAL
PROT UR-MCNC: 26 MG/DL
PROT/CREAT RATIO, UR: 0.13
RBC #/AREA URNS AUTO: 5 /HPF (ref 0–4)
SP GR UR STRIP: 1.03 (ref 1–1.03)
SQUAMOUS #/AREA URNS AUTO: 1 /HPF
URN SPEC COLLECT METH UR: ABNORMAL
UROBILINOGEN UR STRIP-ACNC: NEGATIVE EU/DL
WBC #/AREA URNS AUTO: 1 /HPF (ref 0–5)

## 2017-08-09 PROCEDURE — 99214 OFFICE O/P EST MOD 30 MIN: CPT | Mod: S$GLB,,, | Performed by: OTOLARYNGOLOGY

## 2017-08-09 PROCEDURE — 84156 ASSAY OF PROTEIN URINE: CPT

## 2017-08-09 PROCEDURE — 81001 URINALYSIS AUTO W/SCOPE: CPT

## 2017-08-09 PROCEDURE — 99999 PR PBB SHADOW E&M-EST. PATIENT-LVL III: CPT | Mod: PBBFAC,,, | Performed by: OTOLARYNGOLOGY

## 2017-08-09 PROCEDURE — 3008F BODY MASS INDEX DOCD: CPT | Mod: S$GLB,,, | Performed by: OTOLARYNGOLOGY

## 2017-08-09 NOTE — TELEPHONE ENCOUNTER
2nd attempt to contact patient. Left voicemail that she may return our call regarding results. Patient has appointment today in [place] for 1 month follow up.

## 2017-08-09 NOTE — PROGRESS NOTES
Subjective:       Patient ID: Marsha Marina is a 31 y.o. female.    Chief Complaint: Other (Would like to discuss possible tonsillectomy & adenoidectomy)    Patient is a very pleasant 31-year-old female here to see me today for the first time for evaluation of recurrent sinusitis.  She was here in 6/2015 with Dr. Carson for evaluation of recurrent tonsil stones as well as enlarged tonsils and they had discussed tonsillectomy at that time. She has a history of Lupus and is on Plaquenil and has been treated with 5 courses of antibiotics since January 2017 for sinusitis and URI and has not had sustained improvement.  She'd been treated with Augmentin twice, Cefdinir, and Zithromax, as well as a steroid Dosepak.  I first saw her in late June 2017.  She had a CT of the sinuses shortly prior to that visit.  I recommended antigen specific RAST testing, 21 days of levofloxacin/14 days of metronidazole followed by plain films of the sinuses. Additionally, I recommended that she continue with irrigation of the nasal cavities with budesonide.  RAST testing was completely negative and CBC did not show any evidence of eosinophilia.   She was still having some pressure and congestin but plain films of the sinuses were normal.  I recommended CT to clarify the discrepancy in symptoms and imaging as plain films have lower sensitivity.      Review of Systems:  -     Allergic/Immunologic: is allergic to aspartame; codeine; and morphine..  -     Constitutional: Current temp:            Objective:      Physical Exam   Constitutional: She is oriented to person, place, and time. She appears well-developed and well-nourished. No distress.   HENT:   Head: Normocephalic and atraumatic.   Right Ear: Tympanic membrane, external ear and ear canal normal.   Left Ear: Tympanic membrane, external ear and ear canal normal.   Nose:  No masses/lesions of external nose.  The nasal mucosa is without erythema or discharge, the nasal septum  has no significant deviation and no perforation appreciated , and the inferior turbinates have No abnormal findings bilaterally.  Mouth/Throat: Uvula is midline, oropharynx is clear and moist and mucous membranes are normal. Mucous membranes are not pale and not dry. Normal dentition. No oropharyngeal exudate or posterior oropharyngeal erythema. Tonsils are 2+ on the right. Tonsils are 2+ on the left. No tonsillar exudate (no tonsiliths visualized today).   Eyes: Conjunctivae, EOM and lids are normal. Pupils are equal, round, and reactive to light. Right eye exhibits no chemosis. Left eye exhibits no chemosis. Right conjunctiva is not injected. Left conjunctiva is not injected. No scleral icterus. Right eye exhibits normal extraocular motion and no nystagmus. Left eye exhibits normal extraocular motion and no nystagmus.   Neck: Trachea normal and phonation normal. No tracheal tenderness present. No tracheal deviation present. No thyroid mass and no thyromegaly present.   Cardiovascular: Intact distal pulses.    Pulmonary/Chest: Effort normal. No stridor. No respiratory distress.   Abdominal: She exhibits no distension.   Lymphadenopathy:        Head (right side): No submental, no submandibular, no preauricular, no posterior auricular and no occipital adenopathy present.        Head (left side): No submental, no submandibular, no preauricular, no posterior auricular and no occipital adenopathy present.     She has no cervical adenopathy.   Neurological: She is alert and oriented to person, place, and time. No cranial nerve deficit.   Skin: Skin is warm and dry. No rash noted. No erythema.   Psychiatric: She has a normal mood and affect. Her behavior is normal.       CT sinus June 6, 2017 independently reviewed              CT sinus August 2017- images independently reviewed            Assessment:        Resolved infection    Allergy testing negative for environmental allergies    CBC with no evidence of eosinophilia            Plan:         We discussed her medical conditions, treatments and plan.  Marsha should return to clinic if any issues arise (symptoms worsen or persist), otherwise we will see her back in the clinic only as needed.

## 2017-08-10 DIAGNOSIS — A60.00 GENITAL HERPES: ICD-10-CM

## 2017-08-11 ENCOUNTER — HOSPITAL ENCOUNTER (OUTPATIENT)
Dept: RADIOLOGY | Facility: HOSPITAL | Age: 32
Discharge: HOME | End: 2017-08-11
Attending: OTOLARYNGOLOGY
Payer: COMMERCIAL

## 2017-08-11 DIAGNOSIS — J32.4 CHRONIC PANSINUSITIS: ICD-10-CM

## 2017-08-11 PROCEDURE — 70486 CT MAXILLOFACIAL W/O DYE: CPT | Mod: TC

## 2017-08-12 RX ORDER — VALACYCLOVIR HYDROCHLORIDE 1 G/1
TABLET, FILM COATED ORAL
Qty: 10 TABLET | Refills: 0 | Status: SHIPPED | OUTPATIENT
Start: 2017-08-12 | End: 2019-01-20 | Stop reason: SDUPTHER

## 2017-08-14 ENCOUNTER — PATIENT MESSAGE (OUTPATIENT)
Dept: OTOLARYNGOLOGY | Facility: CLINIC | Age: 32
End: 2017-08-14

## 2017-08-14 ENCOUNTER — PATIENT MESSAGE (OUTPATIENT)
Dept: UROLOGY | Facility: CLINIC | Age: 32
End: 2017-08-14

## 2017-08-14 ENCOUNTER — TELEPHONE (OUTPATIENT)
Dept: OTOLARYNGOLOGY | Facility: CLINIC | Age: 32
End: 2017-08-14

## 2017-08-14 ENCOUNTER — OFFICE VISIT (OUTPATIENT)
Dept: UROLOGY | Facility: CLINIC | Age: 32
End: 2017-08-14
Payer: COMMERCIAL

## 2017-08-14 VITALS — WEIGHT: 203 LBS | SYSTOLIC BLOOD PRESSURE: 122 MMHG | DIASTOLIC BLOOD PRESSURE: 76 MMHG | BODY MASS INDEX: 37.13 KG/M2

## 2017-08-14 DIAGNOSIS — R31.9 HEMATURIA: Primary | ICD-10-CM

## 2017-08-14 LAB
BILIRUB SERPL-MCNC: NORMAL MG/DL
BLOOD URINE, POC: NORMAL
COLOR, POC UA: YELLOW
GLUCOSE UR QL STRIP: NORMAL
KETONES UR QL STRIP: NORMAL
LEUKOCYTE ESTERASE URINE, POC: NORMAL
NITRITE, POC UA: NORMAL
PH, POC UA: 6
PROTEIN, POC: NORMAL
SPECIFIC GRAVITY, POC UA: 1.01
UROBILINOGEN, POC UA: NORMAL

## 2017-08-14 PROCEDURE — 99499 UNLISTED E&M SERVICE: CPT | Mod: S$GLB,,, | Performed by: UROLOGY

## 2017-08-14 PROCEDURE — 52000 CYSTOURETHROSCOPY: CPT | Mod: S$GLB,,, | Performed by: UROLOGY

## 2017-08-14 PROCEDURE — 99999 PR PBB SHADOW E&M-EST. PATIENT-LVL II: CPT | Mod: PBBFAC,,, | Performed by: UROLOGY

## 2017-08-14 PROCEDURE — 81002 URINALYSIS NONAUTO W/O SCOPE: CPT | Mod: S$GLB,,, | Performed by: UROLOGY

## 2017-08-14 RX ORDER — PROMETHAZINE HYDROCHLORIDE 12.5 MG/1
12.5 TABLET ORAL 2 TIMES DAILY PRN
Qty: 40 TABLET | Refills: 1 | Status: SHIPPED | OUTPATIENT
Start: 2017-08-14

## 2017-08-14 NOTE — TELEPHONE ENCOUNTER
Attempted to reach patient to give her results of her recent CT of sinus but no answer, left a message.

## 2017-08-14 NOTE — PROGRESS NOTES
Chief Complaint: Microhematuria    HPI:   8/14/17: CT Urogram reassuring on workup.  Cysto normal.  5/9/17: 30 yo pt with renal problems from SLE is here for microhematuria referred by Dr. Avery.  Works as an  in elementary school.  No abd/pelvic pain and no exac/rel factors.  No gross hematuria.  No urolithiasis.  No urinary bother.  No  history.  Normal sexual function.  No abnormal imaging, but incomplete.    Allergies:  Aspartame; Codeine; and Morphine    Medications: has a current medication list which includes the following prescription(s): budesonide, calcium citrate, diltiazem, ergocalciferol, fluconazole, fluticasone, hydroxychloroquine, labetalol, lactobacillus acidophilus, multivitamin, mycophenolate, pantoprazole, proair hfa, promethazine, valacyclovir, and valacyclovir, and the following Facility-Administered Medications: lidocaine-epinephrine 1%-1:100,000.    Review of Systems:  General: No fever, chills, fatigability, or weight loss.  Skin: No rashes, itching, or changes in color or texture of skin.  Chest: Denies YAN, cyanosis, wheezing, cough, and sputum production.  Abdomen: Appetite fine. No weight loss. Denies diarrhea, abdominal pain, hematemesis, or blood in stool.  Musculoskeletal: No joint stiffness or swelling. Denies back pain.  : As above.  All other review of systems negative.    PMH:   has a past medical history of Abnormal Pap smear of vagina; Asthma; Disorder of kidney and ureter; Genital herpes; Hypertension; and SLE (systemic lupus erythematosus).    PSH:   has a past surgical history that includes Cyst Removal; [place] tooth extraction; Renal biopsy (4/4/16); and Gastric sleeve.    FamHx: family history includes Asthma in her mother and sister; Diabetes in her other; Heart disease in her other; Hyperlipidemia in her father; Hypertension in her other; Rashes / Skin problems in her sister; Stroke in her other.    SocHx:  reports that she has never smoked. She  has never used smokeless tobacco. She reports that she does not drink alcohol or use drugs.     Physical Exam:  Vitals:   Vitals:    08/14/17 1603   BP: 122/76     General: A&Ox3. No apparent distress. No deformities.  Neck: No masses. Normal thyroid.  Lungs: normal inspiration. No use of accessory muscles.  Heart: normal pulse. No arrhythmias.  Abdomen: Soft. NT. ND  Skin: The skin is warm and dry. No jaundice.  Ext: No c/c/e.  : External genitalia normal.     Labs/Studies:     Procedure: Diagnostic Cystoscopy    Procedure in Detail: After proper consents were obtained, the patient was prepped and draped in normal sterile fashion for diagnostic cystoscopy. 5 ml of lidocaine jelly was instilled in the urethra. The flexible cystoscope was then introduced into the urethra, and advanced into the bladder under direct vision. The urethral mucosa appeared normal, and no strictures were noted. The sphincter appeared to be normal. The bladder neck was normal. Inspection of the interior of the bladder was then carried out. The trigone was unremarkable, with no mucosal lesions. The ureteral orifices were normal in position and configuration. Systematic inspection of the mucosa of the bladder it was then carried out, rotating the cystoscope so that all areas of the left and right lateral walls, the dome of the bladder, and the posterior wall were all visualized. The cystoscope was then advanced further into the bladder, and maximum deflection of the scope was performed so that the bladder neck could be inspected. No mucosal lesions were noted there. The cystoscope was then removed, and the procedure terminated.     Findings: normal cysto    Impression/Plan:   1. Negative hematuria workup.  US/RTC 1 year.

## 2017-08-14 NOTE — TELEPHONE ENCOUNTER
----- Message from French Roa MD sent at 8/14/2017 10:15 AM CDT -----  Please call patient with normal results.

## 2017-08-16 ENCOUNTER — CLINICAL SUPPORT (OUTPATIENT)
Dept: OTOLARYNGOLOGY | Facility: CLINIC | Age: 32
End: 2017-08-16
Payer: COMMERCIAL

## 2017-08-16 DIAGNOSIS — J30.9 ALLERGIC RHINITIS, UNSPECIFIED CHRONICITY, UNSPECIFIED SEASONALITY, UNSPECIFIED TRIGGER: ICD-10-CM

## 2017-08-16 PROCEDURE — 95004 PERQ TESTS W/ALRGNC XTRCS: CPT | Mod: S$GLB,,, | Performed by: OTOLARYNGOLOGY

## 2017-08-16 NOTE — Clinical Note
Dr. Roa - this patient did not react to testing and she recently has RAST, which was negative. Just letting you know.  Thanks.

## 2017-08-16 NOTE — PROGRESS NOTES
After two patient identifiers and written consent were obtained, patient's allergies and medications were reviewed and changes were made as necessary.  Testing was discussed in detail.  Patient confirmed she does have asthma, has not been experienced wheezing within the last seven days, has not used an inhaler within the last seven days, is not currently on a beta blocker, and is not on any other medications that are contraindicated for allergy testing.    The patient's forearms were cleansed with alcohol, and the allergen extracts were applied using the Skintestor OMNI device according to departmental procedures and guidelines.     Past Medical History:   Diagnosis Date    Abnormal Pap smear of vagina     10 years ago//had colpo with normal results//    Asthma     Disorder of kidney and ureter     Genital herpes     Hypertension     SLE (systemic lupus erythematosus)      Review of patient's allergies indicates:   Allergen Reactions    Aspartame Anaphylaxis    Codeine Nausea And Vomiting     Bad Headache    Morphine Nausea And Vomiting     Bad Headache       Results obtained from the Modified Quantitative Testing (MQT), including skin endpoint titration (SET) are as follows:      Allergen MTII Wheal   SIZE   Histamine   (+ control) 0   *Alternaria 0   *Apergillus 0   Fusarium 0   *Cladosporium Sphaer 0   Cladosporium Herb 0   *Penicillium Nguyen 0   Mucor Race 0   *Bipolaris 0   Glycerin   (- control) 0       *American Elm 0   *Garland 0   [place] 0   *Pecan 0   *Pinson 0   Dalton 0   Clare Birch 0   Red Russell 0   Bald Drake 0   Red [place] 0       *Short Ragweed 0   English Plantain 0   Rick Elder 0   Mugwort Christopher 0   Dock-Sorrel Mix 0   *Spiny Pigweed 0   Baccharis Weed 0   *Cocklebur [place] 0   *Lambs Quarter 0   Nettle Weed 0       Flo Grass 0   Bermuda Grass 0   *Lonnie Grass 0   *[place] Blue Grass 0   *Farinae Mite 0   *Pteronyssiunus Mite 0   Cockroach 0   Cat Hair 0   Dog Epithelia 0    Feather Mix 0       Patient tolerated testing well, no complaints of pain, discomfort, or shortness of breath.  Patient did not react accordingly to the positive histamine control.  She reports that she recently had RAST testing done.  Advised that I would forward these results to Dr. Roa for further instructions.  Instructed to contact our office with any questions or concerns. Patient verbalized understanding.

## 2017-08-23 ENCOUNTER — PATIENT MESSAGE (OUTPATIENT)
Dept: UROLOGY | Facility: CLINIC | Age: 32
End: 2017-08-23

## 2017-08-29 ENCOUNTER — TELEPHONE (OUTPATIENT)
Dept: OTOLARYNGOLOGY | Facility: CLINIC | Age: 32
End: 2017-08-29

## 2017-08-29 NOTE — TELEPHONE ENCOUNTER
----- Message from French Roa MD sent at 8/28/2017  5:17 PM CDT -----  No allergies detected.  Please let her know that I would stop allergy medications if she is taking any      ----- Message -----  From: Dilcia Rosenthal LPN  Sent: 8/23/2017  12:44 PM  To: MD Dr. Crispin Rebolledo - this patient did not react to testing and she recently has RAST, which was negative. Just letting you know.  Thanks.

## 2017-08-30 NOTE — TELEPHONE ENCOUNTER
Spoke with patient and advised of recommendations regarding allergy medication. Patient is in question of rather or not the test was accurate. She states that if she does not take her Zyrtec at night she has itchy, runny, swelling eyes when she doesn't. She is in question if rather or not she can have the allergy testing redone.

## 2017-09-04 ENCOUNTER — PATIENT MESSAGE (OUTPATIENT)
Dept: UROLOGY | Facility: CLINIC | Age: 32
End: 2017-09-04

## 2017-09-05 ENCOUNTER — PATIENT MESSAGE (OUTPATIENT)
Dept: OTOLARYNGOLOGY | Facility: CLINIC | Age: 32
End: 2017-09-05

## 2017-09-06 DIAGNOSIS — M32.14 STAGE IV LUPUS NEPHRITIS (WHO): ICD-10-CM

## 2017-09-06 DIAGNOSIS — M32.9 SYSTEMIC LUPUS ERYTHEMATOSUS ARTHRITIS: ICD-10-CM

## 2017-09-06 RX ORDER — HYDROXYCHLOROQUINE SULFATE 200 MG/1
200 TABLET, FILM COATED ORAL 2 TIMES DAILY
Qty: 180 TABLET | Refills: 1 | Status: SHIPPED | OUTPATIENT
Start: 2017-09-06 | End: 2017-12-05

## 2017-09-07 ENCOUNTER — PATIENT MESSAGE (OUTPATIENT)
Dept: RHEUMATOLOGY | Facility: CLINIC | Age: 32
End: 2017-09-07

## 2017-09-13 ENCOUNTER — PATIENT MESSAGE (OUTPATIENT)
Dept: RHEUMATOLOGY | Facility: CLINIC | Age: 32
End: 2017-09-13

## 2017-09-13 DIAGNOSIS — M32.9 SYSTEMIC LUPUS ERYTHEMATOSUS ARTHRITIS: Primary | ICD-10-CM

## 2017-09-13 DIAGNOSIS — M32.14 STAGE IV LUPUS NEPHRITIS (WHO): ICD-10-CM

## 2017-09-13 NOTE — TELEPHONE ENCOUNTER
Spoke with pt and she is not able to get her energy level up. States she sleepy all the time now. States one Friday evening she went to bed at 6 pm and woke up Saturday at about 8 am and was still very tired. States she is still working out, taking her vitamins, taking her medications, she is no longer eating meat and is eating a lot of fresh fruits and vegetables. Does not think it is from the fasting since she started that Monday and this has been going on for 2 weeks. If she does not need to come in she would like a few days off work. States that her work is very stressful at the moment. States she sleeps about 10 hours a day and is still tired all the time. Please Advise.

## 2017-09-15 NOTE — TELEPHONE ENCOUNTER
Please check lupus activity markers. Give day off letter as requested until the results are back. Thanks.

## 2017-09-20 ENCOUNTER — PATIENT MESSAGE (OUTPATIENT)
Dept: OTOLARYNGOLOGY | Facility: CLINIC | Age: 32
End: 2017-09-20

## 2017-09-20 DIAGNOSIS — J32.4 CHRONIC PANSINUSITIS: Primary | ICD-10-CM

## 2017-09-22 ENCOUNTER — LAB VISIT (OUTPATIENT)
Dept: LAB | Facility: HOSPITAL | Age: 32
End: 2017-09-22
Attending: INTERNAL MEDICINE
Payer: COMMERCIAL

## 2017-09-22 DIAGNOSIS — M32.9 SYSTEMIC LUPUS ERYTHEMATOSUS ARTHRITIS: ICD-10-CM

## 2017-09-22 DIAGNOSIS — M32.14 STAGE IV LUPUS NEPHRITIS (WHO): ICD-10-CM

## 2017-09-22 LAB
ALBUMIN SERPL BCP-MCNC: 3.8 G/DL
ALP SERPL-CCNC: 57 U/L
ALT SERPL W/O P-5'-P-CCNC: 12 U/L
ANION GAP SERPL CALC-SCNC: 7 MMOL/L
AST SERPL-CCNC: 19 U/L
BACTERIA #/AREA URNS AUTO: ABNORMAL /HPF
BASOPHILS # BLD AUTO: 0.02 K/UL
BASOPHILS NFR BLD: 0.3 %
BILIRUB SERPL-MCNC: 0.7 MG/DL
BILIRUB UR QL STRIP: NEGATIVE
BUN SERPL-MCNC: 8 MG/DL
C3 SERPL-MCNC: 113 MG/DL
C4 SERPL-MCNC: 20 MG/DL
CALCIUM SERPL-MCNC: 9.5 MG/DL
CHLORIDE SERPL-SCNC: 107 MMOL/L
CLARITY UR REFRACT.AUTO: CLEAR
CO2 SERPL-SCNC: 25 MMOL/L
COLOR UR AUTO: YELLOW
CREAT SERPL-MCNC: 0.9 MG/DL
CREAT UR-MCNC: 203 MG/DL
CRP SERPL-MCNC: 12.1 MG/L
DIFFERENTIAL METHOD: NORMAL
EOSINOPHIL # BLD AUTO: 0.2 K/UL
EOSINOPHIL NFR BLD: 3.3 %
ERYTHROCYTE [DISTWIDTH] IN BLOOD BY AUTOMATED COUNT: 13.5 %
ERYTHROCYTE [SEDIMENTATION RATE] IN BLOOD BY WESTERGREN METHOD: 13 MM/HR
EST. GFR  (AFRICAN AMERICAN): >60 ML/MIN/1.73 M^2
EST. GFR  (NON AFRICAN AMERICAN): >60 ML/MIN/1.73 M^2
GLUCOSE SERPL-MCNC: 88 MG/DL
GLUCOSE UR QL STRIP: NEGATIVE
HCT VFR BLD AUTO: 37.5 %
HGB BLD-MCNC: 12.8 G/DL
HGB UR QL STRIP: ABNORMAL
HYALINE CASTS UR QL AUTO: 0 /LPF
KETONES UR QL STRIP: ABNORMAL
LEUKOCYTE ESTERASE UR QL STRIP: NEGATIVE
LYMPHOCYTES # BLD AUTO: 2.7 K/UL
LYMPHOCYTES NFR BLD: 38.2 %
MCH RBC QN AUTO: 29.8 PG
MCHC RBC AUTO-ENTMCNC: 34.1 G/DL
MCV RBC AUTO: 87 FL
MICROSCOPIC COMMENT: ABNORMAL
MONOCYTES # BLD AUTO: 0.5 K/UL
MONOCYTES NFR BLD: 7.5 %
NEUTROPHILS # BLD AUTO: 3.6 K/UL
NEUTROPHILS NFR BLD: 50.6 %
NITRITE UR QL STRIP: NEGATIVE
PH UR STRIP: 5 [PH] (ref 5–8)
PLATELET # BLD AUTO: 179 K/UL
PMV BLD AUTO: 11.4 FL
POTASSIUM SERPL-SCNC: 3.8 MMOL/L
PROT SERPL-MCNC: 7.3 G/DL
PROT UR QL STRIP: ABNORMAL
PROT UR-MCNC: 66 MG/DL
PROT/CREAT RATIO, UR: 0.33
RBC # BLD AUTO: 4.29 M/UL
RBC #/AREA URNS AUTO: 13 /HPF (ref 0–4)
SODIUM SERPL-SCNC: 139 MMOL/L
SP GR UR STRIP: 1.02 (ref 1–1.03)
SQUAMOUS #/AREA URNS AUTO: 1 /HPF
URN SPEC COLLECT METH UR: ABNORMAL
UROBILINOGEN UR STRIP-ACNC: NEGATIVE EU/DL
WBC # BLD AUTO: 7.02 K/UL
WBC #/AREA URNS AUTO: 0 /HPF (ref 0–5)

## 2017-09-22 PROCEDURE — 86160 COMPLEMENT ANTIGEN: CPT

## 2017-09-22 PROCEDURE — 86140 C-REACTIVE PROTEIN: CPT

## 2017-09-22 PROCEDURE — 85651 RBC SED RATE NONAUTOMATED: CPT

## 2017-09-22 PROCEDURE — 85025 COMPLETE CBC W/AUTO DIFF WBC: CPT

## 2017-09-22 PROCEDURE — 86160 COMPLEMENT ANTIGEN: CPT | Mod: 59

## 2017-09-22 PROCEDURE — 80053 COMPREHEN METABOLIC PANEL: CPT

## 2017-09-22 PROCEDURE — 81001 URINALYSIS AUTO W/SCOPE: CPT

## 2017-09-22 PROCEDURE — 86038 ANTINUCLEAR ANTIBODIES: CPT

## 2017-09-22 PROCEDURE — 84156 ASSAY OF PROTEIN URINE: CPT

## 2017-09-22 PROCEDURE — 36415 COLL VENOUS BLD VENIPUNCTURE: CPT | Mod: PO

## 2017-09-24 RX ORDER — VALACYCLOVIR HYDROCHLORIDE 500 MG/1
TABLET, FILM COATED ORAL
Qty: 30 TABLET | Refills: 0 | Status: SHIPPED | OUTPATIENT
Start: 2017-09-24 | End: 2017-11-14 | Stop reason: SDUPTHER

## 2017-09-25 LAB — ANA SER QL IF: NORMAL

## 2017-09-26 ENCOUNTER — PATIENT MESSAGE (OUTPATIENT)
Dept: OTOLARYNGOLOGY | Facility: CLINIC | Age: 32
End: 2017-09-26

## 2017-09-28 ENCOUNTER — PATIENT MESSAGE (OUTPATIENT)
Dept: OBSTETRICS AND GYNECOLOGY | Facility: CLINIC | Age: 32
End: 2017-09-28

## 2017-10-02 RX ORDER — CLINDAMYCIN HYDROCHLORIDE 300 MG/1
300 CAPSULE ORAL 3 TIMES DAILY
Qty: 42 CAPSULE | Refills: 0 | Status: SHIPPED | OUTPATIENT
Start: 2017-10-02 | End: 2017-10-16

## 2017-10-05 ENCOUNTER — TELEPHONE (OUTPATIENT)
Dept: NEPHROLOGY | Facility: CLINIC | Age: 32
End: 2017-10-05

## 2017-10-05 ENCOUNTER — PATIENT MESSAGE (OUTPATIENT)
Dept: OTOLARYNGOLOGY | Facility: CLINIC | Age: 32
End: 2017-10-05

## 2017-10-05 ENCOUNTER — PATIENT MESSAGE (OUTPATIENT)
Dept: NEPHROLOGY | Facility: CLINIC | Age: 32
End: 2017-10-05

## 2017-10-05 NOTE — TELEPHONE ENCOUNTER
----- Message from Nati Mata sent at 10/5/2017 11:40 AM CDT -----  Contact: [facility]  Pharmacy/ [place]  Pharmacy is calling nurse staff regarding a refill RX authorization for Mycophenolate 500 mg. Pt call back [phone] option 1 and option 6 Refer.number [id number]. thanks

## 2017-10-05 NOTE — TELEPHONE ENCOUNTER
Tried to return the call to the pharmacy. The number given is not a working number. I tried several times.

## 2017-10-06 DIAGNOSIS — M32.14 LUPUS NEPHRITIS: ICD-10-CM

## 2017-10-06 RX ORDER — MYCOPHENOLATE MOFETIL 500 MG/1
1000 TABLET ORAL 3 TIMES DAILY
Qty: 540 TABLET | Refills: 3 | Status: SHIPPED | OUTPATIENT
Start: 2017-10-06 | End: 2017-11-02 | Stop reason: SDUPTHER

## 2017-10-06 NOTE — TELEPHONE ENCOUNTER
----- Message from Ana Maria Gamez sent at 10/6/2017  7:58 AM CDT -----  Contact: [facility]  Please call pharmacy [phone], opt 1 then 6 or fax to [phone], or [employer]  regarding medication/Mycothenolate 500mg, states they need  New script

## 2017-10-23 ENCOUNTER — DOCUMENTATION ONLY (OUTPATIENT)
Dept: RHEUMATOLOGY | Facility: CLINIC | Age: 32
End: 2017-10-23

## 2017-10-27 ENCOUNTER — PATIENT MESSAGE (OUTPATIENT)
Dept: OTOLARYNGOLOGY | Facility: CLINIC | Age: 32
End: 2017-10-27

## 2017-11-02 ENCOUNTER — OFFICE VISIT (OUTPATIENT)
Dept: DERMATOLOGY | Facility: CLINIC | Age: 32
End: 2017-11-02
Payer: COMMERCIAL

## 2017-11-02 DIAGNOSIS — D48.5 NEOPLASM OF UNCERTAIN BEHAVIOR OF SKIN: ICD-10-CM

## 2017-11-02 DIAGNOSIS — D23.9 DERMATOFIBROMA: Primary | ICD-10-CM

## 2017-11-02 DIAGNOSIS — B07.8 OTHER VIRAL WARTS: ICD-10-CM

## 2017-11-02 PROCEDURE — 99999 PR PBB SHADOW E&M-EST. PATIENT-LVL III: CPT | Mod: PBBFAC,,, | Performed by: DERMATOLOGY

## 2017-11-02 PROCEDURE — 88305 TISSUE EXAM BY PATHOLOGIST: CPT

## 2017-11-02 PROCEDURE — 88305 TISSUE EXAM BY PATHOLOGIST: CPT | Mod: 26,,,

## 2017-11-02 PROCEDURE — 99203 OFFICE O/P NEW LOW 30 MIN: CPT | Mod: 25,S$GLB,, | Performed by: DERMATOLOGY

## 2017-11-02 PROCEDURE — 11100 PR BIOPSY OF SKIN LESION: CPT | Mod: S$GLB,,, | Performed by: DERMATOLOGY

## 2017-11-02 RX ORDER — MYCOPHENOLATE MOFETIL 500 MG/1
500 TABLET ORAL 2 TIMES DAILY
COMMUNITY
Start: 2016-04-13

## 2017-11-02 NOTE — PATIENT INSTRUCTIONS
Shave Biopsy Wound Care    Your doctor has performed a shave biopsy today.  A band aid and vaseline ointment has been placed over the site.  This should remain in place for 24 hours.  It is recommended that you keep the area dry for the first 24 hours.  After 24 hours, you may remove the band aid and wash the area with warm soap and water and apply Vaseline jelly.  Many patients prefer to use Neosporin or Bacitracin ointment.  This is acceptable; however, know that you can develop an allergy to this medication even if you have used it safely for years.  It is important to keep the area moist.  Letting it dry out and get air slows healing time, and will worsen the scar.  Band aid is optional after first 24 hours.      If you notice increasing redness, tenderness, pain, or yellow drainage at the biopsy site, please notify your doctor.  These are signs of an infection.    If your biopsy site is bleeding, apply firm pressure for 15 minutes straight.  Repeat for another 15 minutes, if it is still bleeding.   If the surgical site continues to bleed, then please contact your doctor.      [facility] - [facility]   DERMATOLOGY  [address]   [place] [address]   Dept: [phone]   Dept Fax: [phone]

## 2017-11-02 NOTE — PROGRESS NOTES
Subjective:       Patient ID:  Marsha Marina is a 31 y.o. female who presents for   Chief Complaint   Patient presents with    Skin Tags     c/o skin tag to groin x 1 yr,,asymptomatic,,no tx    Skin Check     UBSE     History of Present Illness: The patient presents with chief complaint of acrochordon.  Location: groin  Duration: 1 year  Signs/Symptoms: irritation    Prior treatments: none              Review of Systems   Constitutional: Negative for fever and chills.   Skin: Negative for daily sunscreen use, activity-related sunscreen use and recent sunburn.   Hematologic/Lymphatic: Does not bruise/bleed easily.        Objective:    Physical Exam   Constitutional: She appears well-developed and well-nourished. No distress.   Genitourinary:         Neurological: She is alert and oriented to person, place, and time. She is not disoriented.   Psychiatric: She has a normal mood and affect.   Skin:   Areas Examined (abnormalities noted in diagram):   Head / Face Inspection Performed  Neck Inspection Performed  Chest / Axilla Inspection Performed  Abdomen Inspection Performed  Genitals / Buttocks / Groin Inspection Performed  Back Inspection Performed  RUE Inspected  LUE Inspection Performed  RLE Inspected  LLE Inspection Performed  Nails and Digits Inspection Performed                   Diagram Legend     Firm pink to brown papule c/w dermatofibroma     Assessment / Plan:      Pathology Orders:      Normal Orders This Visit    Tissue Specimen To Pathology, Dermatology     Questions:    Directional Terms:  Other(comment)    Clinical information:  condyloma vs. acrochordon    Specific Site:  left labia majora        Dermatofibroma  Reassurance given.  Lesions are benign.    Verruca vulgaris  Extraction attempted today for comedone, unsuccessful.  Most likely wart.  Given facial location, will avoid destruction.  Rx sent for compounded cimetidine/sal acid cream.     Neoplasm of uncertain behavior of skin  -      Tissue Specimen To Pathology, Dermatology  -     Shave biopsy(-ies) done of 1 site(s).   Patient informed to call for results within 2 weeks if have not received notification via telephone call or [facility]             Return for call for results.     PROCEDURE NOTE - SHAVE BIOPSY   Location: left labia majora    After risk, benefits, and alternatives were discussed with the patient, the patient agrees to the procedure by verbal informed consent.  The area(s) were cleansed with alcohol. 2 cc of lidocaine 1% with epinephrine was injected for local anesthesia into each lesion(s).  A sharp scissor was used to remove part or all of the lesion(s).  The specimen(s) will be sent for tissue pathology.  Hemostasis was obtained with aluminum chloride and/or hyfrecation.  The area(s) were dressed with vaseline ointment and bandaged.  The patient tolerated the procedure well without adverse events.  Wound care instructions were given to the patient on the AVS.  The patient will be notified of pathology results once available. Results will also be available in Epic.

## 2017-11-06 ENCOUNTER — PATIENT MESSAGE (OUTPATIENT)
Dept: RHEUMATOLOGY | Facility: CLINIC | Age: 32
End: 2017-11-06

## 2017-11-06 ENCOUNTER — OFFICE VISIT (OUTPATIENT)
Dept: INTERNAL MEDICINE | Facility: CLINIC | Age: 32
End: 2017-11-06
Payer: COMMERCIAL

## 2017-11-06 ENCOUNTER — HOSPITAL ENCOUNTER (OUTPATIENT)
Dept: RADIOLOGY | Facility: HOSPITAL | Age: 32
Discharge: HOME | End: 2017-11-06
Attending: FAMILY MEDICINE
Payer: COMMERCIAL

## 2017-11-06 VITALS
WEIGHT: 188.69 LBS | DIASTOLIC BLOOD PRESSURE: 86 MMHG | SYSTOLIC BLOOD PRESSURE: 136 MMHG | BODY MASS INDEX: 33.43 KG/M2 | OXYGEN SATURATION: 100 % | HEART RATE: 81 BPM | HEIGHT: 63 IN | TEMPERATURE: 100 F

## 2017-11-06 DIAGNOSIS — R50.9 FEVER, UNSPECIFIED FEVER CAUSE: ICD-10-CM

## 2017-11-06 DIAGNOSIS — B34.9 VIRAL INFECTION: ICD-10-CM

## 2017-11-06 DIAGNOSIS — R50.9 FEVER, UNSPECIFIED FEVER CAUSE: Primary | ICD-10-CM

## 2017-11-06 LAB
CTP QC/QA: YES
CTP QC/QA: YES
FLUAV AG NPH QL: NEGATIVE
FLUBV AG NPH QL: NEGATIVE
S PYO RRNA THROAT QL PROBE: NEGATIVE

## 2017-11-06 PROCEDURE — 71020 XR CHEST PA AND LATERAL: CPT | Mod: TC,PO

## 2017-11-06 PROCEDURE — 71020 XR CHEST PA AND LATERAL: CPT | Mod: 26,,, | Performed by: RADIOLOGY

## 2017-11-06 PROCEDURE — 99214 OFFICE O/P EST MOD 30 MIN: CPT | Mod: 25,S$GLB,, | Performed by: FAMILY MEDICINE

## 2017-11-06 PROCEDURE — 99999 PR PBB SHADOW E&M-EST. PATIENT-LVL V: CPT | Mod: PBBFAC,,, | Performed by: FAMILY MEDICINE

## 2017-11-06 PROCEDURE — 87804 INFLUENZA ASSAY W/OPTIC: CPT | Mod: 59,QW,S$GLB, | Performed by: FAMILY MEDICINE

## 2017-11-06 PROCEDURE — 96372 THER/PROPH/DIAG INJ SC/IM: CPT | Mod: S$GLB,,, | Performed by: FAMILY MEDICINE

## 2017-11-06 PROCEDURE — 87880 STREP A ASSAY W/OPTIC: CPT | Mod: QW,S$GLB,, | Performed by: FAMILY MEDICINE

## 2017-11-06 RX ORDER — PROMETHAZINE HYDROCHLORIDE AND DEXTROMETHORPHAN HYDROBROMIDE 6.25; 15 MG/5ML; MG/5ML
5 SYRUP ORAL 4 TIMES DAILY PRN
Qty: 118 ML | Refills: 0 | Status: SHIPPED | OUTPATIENT
Start: 2017-11-06 | End: 2017-11-16

## 2017-11-06 RX ORDER — VALACYCLOVIR HYDROCHLORIDE 500 MG/1
TABLET, FILM COATED ORAL
Qty: 30 TABLET | Refills: 0 | Status: SHIPPED | OUTPATIENT
Start: 2017-11-06 | End: 2018-02-28 | Stop reason: DRUGHIGH

## 2017-11-06 RX ORDER — METHYLPREDNISOLONE ACETATE 80 MG/ML
80 INJECTION, SUSPENSION INTRA-ARTICULAR; INTRALESIONAL; INTRAMUSCULAR; SOFT TISSUE
Status: COMPLETED | OUTPATIENT
Start: 2017-11-06 | End: 2017-11-06

## 2017-11-06 RX ADMIN — METHYLPREDNISOLONE ACETATE 80 MG: 80 INJECTION, SUSPENSION INTRA-ARTICULAR; INTRALESIONAL; INTRAMUSCULAR; SOFT TISSUE at 02:11

## 2017-11-06 NOTE — PROGRESS NOTES
Subjective:       Patient ID: Marsha Marina is a 31 y.o. female.    Chief Complaint: Fever and Generalized Body Aches      Patient complaining of fevers, body aches, congestion, runny nose, sore throat, cough, one episode of diarrhea yesterday. Has been feeling bad for about a week now but feels like she is feeling worse the past 2-3 days. Works in elementary school mostly with kindergarden students, several coworkers are out with the flu.      Fever    Associated symptoms include congestion, coughing, diarrhea, headaches and a sore throat. Pertinent negatives include no abdominal pain, chest pain, nausea, vomiting or wheezing.     Review of Systems   Constitutional: Positive for activity change, chills, diaphoresis, fatigue and fever.   HENT: Positive for congestion, rhinorrhea and sore throat. Negative for sinus pressure.    Eyes: Negative for redness and visual disturbance.   Respiratory: Positive for cough. Negative for chest tightness, shortness of breath and wheezing.    Cardiovascular: Negative for chest pain.   Gastrointestinal: Positive for diarrhea. Negative for abdominal pain, nausea and vomiting.   Endocrine: Negative for polyuria.   Musculoskeletal: Positive for myalgias.   Skin: Negative for color change.   Neurological: Positive for headaches. Negative for dizziness.   Psychiatric/Behavioral: Positive for sleep disturbance.     Past Medical History:   Diagnosis Date    Abnormal Pap smear of vagina     10 years ago//had colpo with normal results//    Asthma     Disorder of kidney and ureter     Genital herpes     Hypertension     SLE (systemic lupus erythematosus)      Past Surgical History:   Procedure Laterality Date    CYST REMOVAL      on back    Gastric sleeve      RENAL BIOPSY  4/4/16    WISDOM TOOTH EXTRACTION       Family History   Problem Relation Age of Onset    Asthma Mother     Hyperlipidemia Father     Asthma Sister     Rashes / Skin problems Sister     Hypertension  "Other     Stroke Other     Heart disease Other     Diabetes Other     Breast cancer Neg Hx     Colon cancer Neg Hx     Ovarian cancer Neg Hx      Social History     Social History    Marital status: Single     Spouse name: N/A    Number of children: N/A    Years of education: N/A     Occupational History    Not on file.     Social History Main Topics    Smoking status: Never Smoker    Smokeless tobacco: Never Used    Alcohol use No    Drug use:      Types: Marijuana      Comment: liquid    Sexual activity: Yes     Partners: Male     Birth control/ protection: None, Condom     Other Topics Concern    Not on file     Social History Narrative    No narrative on file     Review of patient's allergies indicates:   Allergen Reactions    Aspartame Anaphylaxis    Codeine Nausea And Vomiting     Bad Headache    Morphine Nausea And Vomiting     Bad Headache       Objective:       /86 (BP Location: Right arm, Patient Position: Sitting)   Pulse 81   Temp 100 °F (37.8 °C) (Tympanic)   Ht 5' 2.5" (1.588 m)   Wt 85.6 kg (188 lb 11.4 oz)   LMP 10/17/2017   SpO2 100%   BMI 33.97 kg/m²   Physical Exam   Constitutional: She is oriented to person, place, and time. Vital signs are normal. She appears well-developed and well-nourished. No distress.   HENT:   Head: Normocephalic and atraumatic.   Right Ear: Hearing, tympanic membrane, external ear and ear canal normal.   Left Ear: Hearing, tympanic membrane, external ear and ear canal normal.   Nose: Mucosal edema present.   Mouth/Throat: Uvula is midline and mucous membranes are normal. Posterior oropharyngeal edema and posterior oropharyngeal erythema present. No oropharyngeal exudate.   Eyes: Conjunctivae and EOM are normal. Pupils are equal, round, and reactive to light.   Neck: Normal range of motion. Neck supple. No tracheal deviation present. No thyromegaly present.   Cardiovascular: Normal rate, regular rhythm, normal heart sounds and intact distal " pulses.    No murmur heard.  Pulmonary/Chest: Effort normal and breath sounds normal. No respiratory distress.   Abdominal: Soft. Bowel sounds are normal. No hernia.   Musculoskeletal: Normal range of motion. She exhibits no edema.   Lymphadenopathy:     She has no cervical adenopathy.   Neurological: She is alert and oriented to person, place, and time.   Skin: Skin is warm. Capillary refill takes less than 2 seconds. She is diaphoretic.   Psychiatric: She has a normal mood and affect. Her behavior is normal. Judgment and thought content normal.   Nursing note and vitals reviewed.    Assessment:     1. Fever, unspecified fever cause    2. Viral infection      Plan:   Fever, unspecified fever cause  -     X-Ray Chest PA And Lateral; Future; Expected date: 11/06/2017  -     POCT Influenza A/B  -     POCT Rapid Strep A    Viral infection    Other orders  -     promethazine-dextromethorphan (PROMETHAZINE-DM) 6.25-15 mg/5 mL Syrp; Take 5 mLs by mouth 4 (four) times daily as needed (cough).  Dispense: 118 mL; Refill: 0  -     methylPREDNISolone acetate injection 80 mg; Inject 1 mL (80 mg total) into the muscle one time.      Medication List with Changes/Refills   New Medications    PROMETHAZINE-DEXTROMETHORPHAN (PROMETHAZINE-DM) 6.25-15 MG/5 ML SYRP    Take 5 mLs by mouth 4 (four) times daily as needed (cough).   Current Medications    BUDESONIDE (PULMICORT) 0.5 MG/2 ML NEBULIZER SOLUTION        CALCIUM CITRATE ORAL    Take 1 tablet by mouth once daily at 6am.    DILTIAZEM (CARDIZEM CD) 360 MG 24 HR CAPSULE    TAKE 1 CAPSULE(360 MG) BY MOUTH EVERY DAY    FLUCONAZOLE (DIFLUCAN) 100 MG TABLET    Take 100 mg by mouth once daily.    FLUTICASONE (FLONASE) 50 MCG/ACTUATION NASAL SPRAY    2 sprays by Each Nare route once daily at 6am.    HYDROXYCHLOROQUINE (PLAQUENIL) 200 MG TABLET    Take 1 tablet (200 mg total) by mouth 2 (two) times daily.    LABETALOL (NORMODYNE) 200 MG TABLET    Take 1 tablet (200 mg total) by mouth every  12 (twelve) hours.    LACTOBACILLUS ACIDOPHILUS (PROBIOTIC ORAL)    Take 1 tablet by mouth once daily at 6am.    MULTIVITAMIN CAPSULE    Take 1 capsule by mouth once daily.    MYCOPHENOLATE (CELLCEPT) 500 MG TAB    Take by mouth.    PANTOPRAZOLE (PROTONIX) 40 MG TABLET    Take 40 mg by mouth once daily.     PROAIR HFA 90 MCG/ACTUATION INHALER    Inhale 2 puffs into the lungs as needed.    PROMETHAZINE (PHENERGAN) 12.5 MG TAB    Take 1 tablet (12.5 mg total) by mouth 2 (two) times daily as needed.    VALACYCLOVIR (VALTREX) 1000 MG TABLET    TAKE 1 TABLET BY MOUTH TWICE DAILY    VALACYCLOVIR (VALTREX) 500 MG TABLET    TAKE 1 TABLET(500 MG) BY MOUTH EVERY DAY   Discontinued Medications    ERGOCALCIFEROL (ERGOCALCIFEROL) 50,000 UNIT CAP    Take 1 capsule (50,000 Units total) by mouth every 7 days.

## 2017-11-06 NOTE — LETTER
November 6, 2017      [facility]  [id number] [place] [id number]  Phone: [phone]       Patient: Marsha Marina   YOB: 1985  Date of Visit: 11/06/2017    To Whom It May Concern:    Stacy Marina  was at [facility] on 11/06/2017. SHE may return to work on 11/09/2017 With/no restrictions. If you have any questions or concerns, or if I can be of further assistance, please do not hesitate to contact me.    Sincerely,    Huy Silverman MA

## 2017-11-06 NOTE — PROGRESS NOTES
Depo-Medrol 80 mg/ml IM left ventrogluteal.  Lot#  [id number] exp 12/2019  [facility]  Pt advised to wait in clinic 15 minutes to monitor for side effects.  Pt voiced understanding and tolerated injection well.

## 2017-11-12 ENCOUNTER — PATIENT MESSAGE (OUTPATIENT)
Dept: OBSTETRICS AND GYNECOLOGY | Facility: CLINIC | Age: 32
End: 2017-11-12

## 2017-11-14 DIAGNOSIS — A60.04 HERPES SIMPLEX VULVOVAGINITIS: ICD-10-CM

## 2017-11-14 RX ORDER — VALACYCLOVIR HYDROCHLORIDE 500 MG/1
500 TABLET, FILM COATED ORAL DAILY
Qty: 30 TABLET | Refills: 2 | Status: SHIPPED | OUTPATIENT
Start: 2017-11-14 | End: 2018-02-28 | Stop reason: SDUPTHER

## 2017-11-14 RX ORDER — VALACYCLOVIR HYDROCHLORIDE 1 G/1
1000 TABLET, FILM COATED ORAL 2 TIMES DAILY
Qty: 14 TABLET | Refills: 0 | Status: SHIPPED | OUTPATIENT
Start: 2017-11-14 | End: 2018-02-28

## 2017-11-17 ENCOUNTER — LAB VISIT (OUTPATIENT)
Dept: LAB | Facility: HOSPITAL | Age: 32
End: 2017-11-17
Attending: INTERNAL MEDICINE
Payer: COMMERCIAL

## 2017-11-17 DIAGNOSIS — M32.14 LUPUS NEPHRITIS: ICD-10-CM

## 2017-11-17 LAB
BACTERIA #/AREA URNS AUTO: ABNORMAL /HPF
BILIRUB UR QL STRIP: NEGATIVE
CLARITY UR REFRACT.AUTO: ABNORMAL
COLOR UR AUTO: YELLOW
CREAT UR-MCNC: 361 MG/DL
GLUCOSE UR QL STRIP: NEGATIVE
HGB UR QL STRIP: NEGATIVE
HYALINE CASTS UR QL AUTO: 0 /LPF
KETONES UR QL STRIP: ABNORMAL
LEUKOCYTE ESTERASE UR QL STRIP: NEGATIVE
MICROSCOPIC COMMENT: ABNORMAL
NITRITE UR QL STRIP: NEGATIVE
NON-SQ EPI CELLS #/AREA URNS AUTO: 0 /HPF
PH UR STRIP: 5 [PH] (ref 5–8)
PROT UR QL STRIP: ABNORMAL
PROT UR-MCNC: 69 MG/DL
PROT/CREAT RATIO, UR: 0.19
RBC #/AREA URNS AUTO: 15 /HPF (ref 0–4)
SP GR UR STRIP: >1.03 (ref 1–1.03)
SQUAMOUS #/AREA URNS AUTO: 0 /HPF
URN SPEC COLLECT METH UR: ABNORMAL
UROBILINOGEN UR STRIP-ACNC: NEGATIVE EU/DL
WBC #/AREA URNS AUTO: 1 /HPF (ref 0–5)

## 2017-11-17 PROCEDURE — 81001 URINALYSIS AUTO W/SCOPE: CPT

## 2017-11-17 PROCEDURE — 84156 ASSAY OF PROTEIN URINE: CPT

## 2017-11-27 ENCOUNTER — LAB VISIT (OUTPATIENT)
Dept: LAB | Facility: HOSPITAL | Age: 32
End: 2017-11-27
Attending: INTERNAL MEDICINE
Payer: COMMERCIAL

## 2017-11-27 ENCOUNTER — OFFICE VISIT (OUTPATIENT)
Dept: RHEUMATOLOGY | Facility: CLINIC | Age: 32
End: 2017-11-27
Payer: COMMERCIAL

## 2017-11-27 ENCOUNTER — OFFICE VISIT (OUTPATIENT)
Dept: NEPHROLOGY | Facility: CLINIC | Age: 32
End: 2017-11-27
Payer: COMMERCIAL

## 2017-11-27 VITALS
WEIGHT: 184.5 LBS | DIASTOLIC BLOOD PRESSURE: 72 MMHG | HEART RATE: 74 BPM | SYSTOLIC BLOOD PRESSURE: 118 MMHG | HEART RATE: 84 BPM | SYSTOLIC BLOOD PRESSURE: 135 MMHG | BODY MASS INDEX: 32.66 KG/M2 | HEIGHT: 63 IN | WEIGHT: 184.31 LBS | DIASTOLIC BLOOD PRESSURE: 82 MMHG | BODY MASS INDEX: 33.21 KG/M2

## 2017-11-27 DIAGNOSIS — I10 HTN (HYPERTENSION), BENIGN: ICD-10-CM

## 2017-11-27 DIAGNOSIS — M32.14 STAGE IV LUPUS NEPHRITIS (WHO): ICD-10-CM

## 2017-11-27 DIAGNOSIS — D84.9 IMMUNOSUPPRESSION: ICD-10-CM

## 2017-11-27 DIAGNOSIS — M32.14: ICD-10-CM

## 2017-11-27 DIAGNOSIS — M32.14 DRUG-INDUCED SYSTEMIC LUPUS ERYTHEMATOSUS WITH GLOMERULAR DISEASE: Primary | ICD-10-CM

## 2017-11-27 DIAGNOSIS — M32.9 SYSTEMIC LUPUS ERYTHEMATOSUS ARTHRITIS: ICD-10-CM

## 2017-11-27 DIAGNOSIS — M32.9 SYSTEMIC LUPUS ERYTHEMATOSUS ARTHRITIS: Primary | ICD-10-CM

## 2017-11-27 DIAGNOSIS — M32.0 DRUG-INDUCED SYSTEMIC LUPUS ERYTHEMATOSUS WITH GLOMERULAR DISEASE: Primary | ICD-10-CM

## 2017-11-27 PROCEDURE — 99214 OFFICE O/P EST MOD 30 MIN: CPT | Mod: S$GLB,,, | Performed by: INTERNAL MEDICINE

## 2017-11-27 PROCEDURE — 99999 PR PBB SHADOW E&M-EST. PATIENT-LVL III: CPT | Mod: PBBFAC,,, | Performed by: INTERNAL MEDICINE

## 2017-11-27 PROCEDURE — 86160 COMPLEMENT ANTIGEN: CPT | Mod: 59

## 2017-11-27 PROCEDURE — 99999 PR PBB SHADOW E&M-EST. PATIENT-LVL IV: CPT | Mod: PBBFAC,,, | Performed by: INTERNAL MEDICINE

## 2017-11-27 PROCEDURE — 36415 COLL VENOUS BLD VENIPUNCTURE: CPT | Mod: PO

## 2017-11-27 PROCEDURE — 86140 C-REACTIVE PROTEIN: CPT

## 2017-11-27 PROCEDURE — 86160 COMPLEMENT ANTIGEN: CPT

## 2017-11-27 PROCEDURE — 86038 ANTINUCLEAR ANTIBODIES: CPT

## 2017-11-27 NOTE — PROGRESS NOTES
Subjective:       Patient ID: Marsha Marina is a 31 y.o.   female who presents for follow-up evaluation of Lupus Nephrtis, HTN, edema , CKD stage 2/ 3        Sonia Castorena DO      HPI :   Marsha Marina is a pleasant 31-year-old -American woman seen in office today in follow-up for above medical problems. I saw her in clinic about 8 months ago.  Recent lab results were discussed with the patient.  Renal function is stable with a serum creatinine of 0.9 mg/dL.  She has normal range proteinuria at 0.2 g per day.  She is status post renal biopsy in April 2016, class 4/5 lupus nephritis, she is on Plaquenil,  CellCept 1000 mg BID , she had a  gastric sleeve surgery in 2/2017, performed by Dr. Cardona.  Lost about 60 pounds since previous visit.      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 About 5-10% of interstitial fibrosis was reported on this biopsy.         Past Medical History:   Diagnosis Date    Abnormal Pap smear of vagina     10 years ago//had colpo with normal results//    Asthma     Disorder of kidney and ureter     Genital herpes     Hypertension     SLE (systemic lupus erythematosus)          Current Outpatient Prescriptions on File Prior to Visit   Medication Sig Dispense Refill    budesonide (PULMICORT) 0.5 mg/2 mL nebulizer solution       CALCIUM CITRATE ORAL Take 1 tablet by mouth once daily at 6am.      diltiaZEM (CARDIZEM CD) 360 MG 24 hr capsule TAKE 1 CAPSULE(360 MG) BY MOUTH EVERY DAY 30 capsule 11    fluconazole (DIFLUCAN) 100 MG tablet Take 100 mg by mouth once daily.      fluticasone (FLONASE) 50 mcg/actuation nasal spray 2 sprays by Each Nare route once daily at 6am.      hydroxychloroquine (PLAQUENIL) 200 mg tablet Take 1 tablet (200 mg total) by mouth 2 (two) times daily. 180 tablet 1     labetalol (NORMODYNE) 200 MG tablet Take 1 tablet (200 mg total) by mouth every 12 (twelve) hours. 60 tablet 11    LACTOBACILLUS ACIDOPHILUS (PROBIOTIC ORAL) Take 1 tablet by mouth once daily at 6am.      multivitamin capsule Take 1 capsule by mouth once daily.      mycophenolate (CELLCEPT) 500 mg Tab Take 1,000 mg by mouth 3 (three) times daily.       pantoprazole (PROTONIX) 40 MG tablet Take 40 mg by mouth once daily.   1    PROAIR HFA 90 mcg/actuation inhaler Inhale 2 puffs into the lungs as needed. 18 g 0    promethazine (PHENERGAN) 12.5 MG Tab Take 1 tablet (12.5 mg total) by mouth 2 (two) times daily as needed. 40 tablet 1    valACYclovir (VALTREX) 1000 MG tablet Take 1 tablet (1,000 mg total) by mouth 2 (two) times daily. 14 tablet 0    valACYclovir (VALTREX) 500 MG tablet TAKE 1 TABLET(500 MG) BY MOUTH EVERY DAY 30 tablet 0    valACYclovir (VALTREX) 500 MG tablet Take 1 tablet (500 mg total) by mouth once daily. 30 tablet 2     Current Facility-Administered Medications on File Prior to Visit   Medication Dose Route Frequency Provider Last Rate Last Dose    lidocaine-EPINEPHrine 1%-1:100,000 injection 1 mL  1 mL Intradermal Once Jack Lazaro MD             Review of Systems  :      Constitutional: Negative for activity change, appetite change and fever.   HENT: Negative for congestion, facial swelling, sore throat, trouble swallowing and voice change.   Eyes: Negative for redness and visual disturbance.   Respiratory: Negative for apnea, cough, chest tightness, shortness of breath and wheezing.   Cardiovascular: Negative for chest pain, palpitations and leg swelling.   Gastrointestinal: Negative for abdominal distention, abdominal pain, blood in stool, constipation, diarrhea and vomiting.   Genitourinary: Negative for decreased urine volume, difficulty urinating, dysuria, flank pain, frequency, hematuria, pelvic pain and urgency.   Musculoskeletal: Negative for back pain, gait problem and joint  swelling.   Skin: Negative for color change and rash.   Neurological: Negative for dizziness, syncope, weakness and headaches.   Hematological: Does not bruise/bleed easily.   Psychiatric/Behavioral: Negative for agitation, behavioral problems and confusion. The patient is not nervous/anxious.         Objective:         Vitals:    11/27/17 1615   BP: 118/72   Pulse: 84       Respiratory rate 20, afebrile, weight 184 pounds, previous weight was 245 pounds      Physical Exam  :      Constitutional: She is oriented to person, place, and time. She appears well-developed and well-nourished. No distress.    HENT:  Head: Normocephalic and atraumatic. Oropharynx is clear and moist. No oropharyngeal exudate.    Eyes: Conjunctivae and EOM are normal. Pupils are equal, round, and reactive to light. No scleral icterus.    Neck: Normal range of motion. Neck supple. No JVD present. Carotid bruit is not present. No tracheal deviation present. No thyroid mass and no thyromegaly present.    Cardiovascular: Normal rate, regular rhythm, normal heart sounds and intact distal pulses. Exam reveals no gallop and no friction rub.    No murmur heard.  Pulmonary/Chest: Effort normal and breath sounds normal. No respiratory distress. She has no wheezes. She has no rales. She exhibits no tenderness.    Abdominal: Soft. Bowel sounds are normal. She exhibits no distension, no abdominal bruit, no ascites and no mass. There is no hepatosplenomegaly. There is no tenderness. There is no rebound, no guarding and no CVA tenderness. Obese    Musculoskeletal: Normal range of motion. She exhibits no edema. She exhibits no tenderness.   Lymphadenopathy: She has no cervical adenopathy.   Neurological: She is alert and oriented to person, place, and time. She has normal reflexes. No cranial nerve deficit. She exhibits normal muscle tone. Coordination normal.    Skin: Skin is warm and intact. No rash noted. No erythema. No pallor.   Psychiatric: She has a  normal mood and affect. Her behavior is normal. Judgment normal.          Labs:    Lab Results   Component Value Date    CREATININE 0.9 11/17/2017    BUN 15 11/17/2017     11/17/2017    K 3.8 11/17/2017     (H) 11/17/2017    CO2 21 (L) 11/17/2017       Lab Results   Component Value Date    WBC 5.58 11/17/2017    HGB 11.9 (L) 11/17/2017    HCT 35.7 (L) 11/17/2017    MCV 89 11/17/2017     11/17/2017       Lab Results   Component Value Date    PTH 59.0 06/27/2016    CALCIUM 9.0 11/17/2017    PHOS 3.6 11/17/2017       Lab Results   Component Value Date    ALBUMIN 3.8 11/17/2017            Impression and Plan : 31 Y Old AA woman seen in f/u for following medical problems,        1. Lupus Nephritis  class IV and class V lupus nephritis :  Only 5-10% of interstitial fibrosis reported on the renal biopsy, on CellCept 1000 mg BID , Plaquenil 200 mg daily.  Renal function is stable with a serum creatinine of  0.9 mg/dL,  she also has normal range proteinuria, will discuss with rheumatology if we can lower her CellCept dose to 500 mg twice a day,       2. HTN : BP controlled      3. Volume : edema resolved     4. Stable lytes       4.  Secondary hyperparathyroidism - cont  vitamin D 2,      6. Anemia : stable Hb , follow       7. Hyperuricemia - controlled,       8. Proteinuria - most recent urinalysis shows a proteinuria of about 0.2 g per day, she is not on ACE inhibitor/ARBs due to fluctuating serum creatinine and episodes of acute renal failure.      9.  Microscopic hematuria - can be related to her lupus, she had a cystoscopy in June 2017, this was negative,      Follow-up in 6  months, discussed plan with patient . More than 30 minutes of face-to-face time discussing labs and plan of care.        Hussain Avery MD

## 2017-11-27 NOTE — PROGRESS NOTES
RHEUMATOLOGY CLINIC FOLLOW UP VISIT  Chief complaints:-  To follow up for lupus.     HPI:-  Marsha Serrano a 31 y.o. pleasant female comes in for a follow up visit with above chief complaints. She has complex PMH including hypertensive encephalopathy and lupus nephritis. She is on Cellcept and plaquenil for lupus.  Today she reports sore throat with some throat infection for past 3 weeks. She is taking cellcept which is causing stomach issues. She also complains of worsening fatigue associated with joint pains.    Physical examination:-    Vitals:    11/27/17 1647   BP: 135/82   Pulse: 74   Weight: 83.7 kg (184 lb 8.4 oz)   PainSc:   6   PainLoc: Generalized       Physical Exam   Constitutional: She is oriented to person, place, and time and well-developed, well-nourished, and in no distress. No distress.   HENT:   Head: Normocephalic.   Mouth/Throat: Oropharynx is clear and moist.   Eyes: Conjunctivae and EOM are normal. Pupils are equal, round, and reactive to light. Right eye exhibits no discharge. Left eye exhibits no discharge. No scleral icterus.   Neck: Normal range of motion. Neck supple.   Cardiovascular: Normal rate and intact distal pulses.    Pulmonary/Chest: Effort normal. No respiratory distress.   Abdominal: Soft. There is no tenderness.   Musculoskeletal:   No synovitis over small joints.   Bilateral wrists nontender.  No effusion. No sclerodactyly or telangiectasias. No malar rash. Normal nail fold capillaries.    Lymphadenopathy:     She has no cervical adenopathy.   Neurological: She is alert and oriented to person, place, and time. No cranial nerve deficit.   Skin: Skin is warm. No rash noted. She is not diaphoretic. No erythema.   Psychiatric: Mood, affect and judgment normal.   Nursing note and vitals reviewed.      Labs:-  Results for MARSHA ROMEO (MRN [id number]) as of 3/31/2017 16:04   Ref. Range 4/11/2016 14:35 5/9/2016 16:25 8/18/2016 15:00 11/9/2016 10:46   DEEP HEP-2  Titer Unknown Positive 1:640 Ho...      Anti-SSA Antibody Latest Ref Range: 0.00 - 19.99 EU 0.31      Anti-SSA Interpretation Latest Ref Range: Negative  Negative      Anti-SSB Antibody Latest Ref Range: 0.00 - 19.99 EU 0.00      Anti-SSB Interpretation Latest Ref Range: Negative  Negative      ds DNA Ab Latest Ref Range: Negative 1:10  Positive 1:80 (A)      Anti Sm Antibody Latest Ref Range: 0.00 - 19.99 EU 1.22      Anti-Sm Interpretation Latest Ref Range: Negative  Negative      Anti Sm/RNP Antibody Latest Ref Range: 0.00 - 19.99 EU 0.34      Anti-Sm/RNP Interpretation Latest Ref Range: Negative  Negative      Complement (C-3) Latest Ref Range: 50 - 180 mg/dL 55 98 123 130   Complement (C-4) Latest Ref Range: 11 - 44 mg/dL 6 (L) 17 21 25       Medication List with Changes/Refills   Current Medications    BUDESONIDE (PULMICORT) 0.5 MG/2 ML NEBULIZER SOLUTION        CALCIUM CITRATE ORAL    Take 1 tablet by mouth once daily at 6am.    DILTIAZEM (CARDIZEM CD) 360 MG 24 HR CAPSULE    TAKE 1 CAPSULE(360 MG) BY MOUTH EVERY DAY    FLUCONAZOLE (DIFLUCAN) 100 MG TABLET    Take 100 mg by mouth once daily.    FLUTICASONE (FLONASE) 50 MCG/ACTUATION NASAL SPRAY    2 sprays by Each Nare route once daily at 6am.    HYDROXYCHLOROQUINE (PLAQUENIL) 200 MG TABLET    Take 1 tablet (200 mg total) by mouth 2 (two) times daily.    LABETALOL (NORMODYNE) 200 MG TABLET    Take 1 tablet (200 mg total) by mouth every 12 (twelve) hours.    LACTOBACILLUS ACIDOPHILUS (PROBIOTIC ORAL)    Take 1 tablet by mouth once daily at 6am.    MULTIVITAMIN CAPSULE    Take 1 capsule by mouth once daily.    MYCOPHENOLATE (CELLCEPT) 500 MG TAB    Take 1,000 mg by mouth 2 (two) times daily.    PANTOPRAZOLE (PROTONIX) 40 MG TABLET    Take 40 mg by mouth once daily.     PROAIR HFA 90 MCG/ACTUATION INHALER    Inhale 2 puffs into the lungs as needed.    PROMETHAZINE (PHENERGAN) 12.5 MG TAB    Take 1 tablet (12.5 mg total) by mouth 2 (two) times daily as needed.     VALACYCLOVIR (VALTREX) 1000 MG TABLET    Take 1 tablet (1,000 mg total) by mouth 2 (two) times daily.    VALACYCLOVIR (VALTREX) 500 MG TABLET    TAKE 1 TABLET(500 MG) BY MOUTH EVERY DAY    VALACYCLOVIR (VALTREX) 500 MG TABLET    Take 1 tablet (500 mg total) by mouth once daily.       Assessment/Plans:-  # Stage IV and V lupus nephritis (WHO):-  Stable lupus nephritis with stable Urine P/C ratio, normal serum creatinine under treatment by Nehrologist . Since she reports GI intolerance to cellcept, I will check her serological activity profile today and discuss with  with the results about alternative therapies like prograf/imuran.     # obesity:-  Underwent gastric bypass. Have lost weight significantly since the surgery.     # Systemic lupus erythematosus arthritis:-  No synovitis on exam today. The pain sounds like myofacial in origin which could be secondary to the recent Viral URI. Re evaluate after resolution of symptoms.     # Immunosuppression:-  High dose immunosuppressive therapy. Hold medications with any infection.       # Return in about 3 months (around 2/27/2018).    Disclaimer: This note was prepared using voice recognition system and is likely to have sound alike errors and is not proof read.  Please call me with any questions.

## 2017-11-27 NOTE — PATIENT INSTRUCTIONS
Avoid NSAID pain medications such as advil, aleve, motrin, ibuprofen, naprosyn, meloxicam, diclofenac, mobic.     Low-Salt Diet:      This diet removes foods that are high in salt. It also limits the amount of salt you use when cooking. It is most often used for people with high blood pressure, edema (fluid retention), and kidney, liver, or heart disease.  Table salt contains the mineral sodium. Your body needs sodium to work normally. But too much sodium can make your health problems worse. Your healthcare provider is recommending a low-salt (also called low-sodium) diet for you. Your total daily allowance of salt is 1,500 to 2,300 milligrams (mg). It is less than 1 teaspoon of table salt. This means you can have only about 500 to 700 mg of sodium at each meal. People with certain health problems should limit salt intake to the lower end of the recommended range.    When you cook, dont add much salt. If you can cook without using salt, even better. Dont add salt to your food at the table.  When shopping, read food labels. Salt is often called sodium on the label. Choose foods that are salt-free, low salt, or very low salt. Note that foods with reduced salt may not lower your salt intake enough.    Beans, potatoes, and pasta  Ok: Dry beans, split peas, lentils, potatoes, rice, macaroni, pasta, spaghetti without added salt  Avoid: Potato chips, tortilla chips, and similar products  Breads and cereals  Ok: Low-sodium breads, rolls, cereals, and cakes; low-salt crackers, matzo crackers  Avoid: Salted crackers, pretzels, popcorn, Syriac toast, pancakes, muffins  Dairy  Ok: Milk, chocolate milk, hot chocolate mix, low-salt cheeses, and yogurt  Avoid: Processed cheese and cheese spreads; Roquefort, Camembert, and cottage cheese; buttermilk, instant breakfast drink  Desserts  Ok: Ice cream, frozen yogurt, juice bars, gelatin, cookies and pies, sugar, honey, jelly, hard candy  Avoid: Most pies, cakes and cookies  prepared or processed with salt; instant pudding  Drinks  Ok: Tea, coffee, fizzy (carbonated) drinks, juices  Avoid: Flavored coffees, electrolyte replacement drinks, sports drinks  Meats  Ok: All fresh meat, fish, poultry, low-salt tuna, eggs, egg substitute  Avoid: Smoked, pickled, brine-cured, or salted meats and fish. This includes jones, chipped beef, corned beef, hot dogs, deli meats, ham, kosher meats, salt pork, sausage, canned tuna, salted codfish, smoked salmon, herring, sardines, or anchovies.  Seasonings and spices  Ok: Most seasonings are okay. Good substitutes for salt include: fresh herb blends, hot sauce, lemon, garlic, grajeda, vinegar, dry mustard, parsley, cilantro, horseradish, tomato paste, regular margarine, mayonnaise, unsalted butter, cream cheese, vegetable oil, cream, low-salt salad dressing and gravy.  Avoid: Regular ketchup, relishes, pickles, soy sauce, teriyaki sauce, Worcestershire sauce, BBQ sauce, tartar sauce, meat tenderizer, chili sauce, regular gravy, regular salad dressing, salted butter  Soups  Ok: Low-salt soups and broths made with allowed foods  Avoid: Bouillon cubes, soups with smoked or salted meats, regular soup and broth  Vegetables  Ok: Most vegetables are okay; also low-salt tomato and vegetable juices  Avoid: Sauerkraut and other brine-soaked vegetables; pickles and other pickled vegetables; tomato juice, olives  Date Last Reviewed: 8/1/2016  © 3712-3786 [employer]. [address] [place] [address], [place] [address]. All rights reserved. This information is not intended as a substitute for professional medical care. Always follow your healthcare professional's instructions.

## 2017-11-28 ENCOUNTER — OFFICE VISIT (OUTPATIENT)
Dept: INTERNAL MEDICINE | Facility: CLINIC | Age: 32
End: 2017-11-28
Payer: COMMERCIAL

## 2017-11-28 VITALS
BODY MASS INDEX: 32.07 KG/M2 | OXYGEN SATURATION: 99 % | DIASTOLIC BLOOD PRESSURE: 72 MMHG | WEIGHT: 181 LBS | SYSTOLIC BLOOD PRESSURE: 116 MMHG | TEMPERATURE: 97 F | HEART RATE: 72 BPM | HEIGHT: 63 IN

## 2017-11-28 DIAGNOSIS — F41.9 ANXIETY: Primary | ICD-10-CM

## 2017-11-28 DIAGNOSIS — F32.1 MODERATE SINGLE CURRENT EPISODE OF MAJOR DEPRESSIVE DISORDER: ICD-10-CM

## 2017-11-28 LAB
C3 SERPL-MCNC: 98 MG/DL
C4 SERPL-MCNC: 13 MG/DL
CRP SERPL-MCNC: 1 MG/L

## 2017-11-28 PROCEDURE — 99214 OFFICE O/P EST MOD 30 MIN: CPT | Mod: S$GLB,,, | Performed by: INTERNAL MEDICINE

## 2017-11-28 PROCEDURE — 99999 PR PBB SHADOW E&M-EST. PATIENT-LVL III: CPT | Mod: PBBFAC,,, | Performed by: INTERNAL MEDICINE

## 2017-11-28 RX ORDER — ESCITALOPRAM OXALATE 10 MG/1
10 TABLET ORAL DAILY
Qty: 30 TABLET | Refills: 1 | Status: SHIPPED | OUTPATIENT
Start: 2017-11-28 | End: 2017-12-11 | Stop reason: SINTOL

## 2017-11-28 NOTE — PATIENT INSTRUCTIONS
Escitalopram tablets  What is this medicine?  ESCITALOPRAM (es sye JOY oh pram) is used to treat depression and certain types of anxiety.  How should I use this medicine?  Take this medicine by mouth with a glass of water. Follow the directions on the prescription label. You can take it with or without food. If it upsets your stomach, take it with food. Take your medicine at regular intervals. Do not take it more often than directed. Do not stop taking this medicine suddenly except upon the advice of your doctor. Stopping this medicine too quickly may cause serious side effects or your condition may worsen.  A special MedGuide will be given to you by the pharmacist with each prescription and refill. Be sure to read this information carefully each time.  Talk to your pediatrician regarding the use of this medicine in children. Special care may be needed.  What side effects may I notice from receiving this medicine?  Side effects that you should report to your doctor or health care professional as soon as possible:  · allergic reactions like skin rash, itching or hives, swelling of the face, lips, or tongue  · confusion  · feeling faint or lightheaded, falls  · fast talking and excited feelings or actions that are out of control  · hallucination, loss of contact with reality  · seizures  · suicidal thoughts or other mood changes  · unusual bleeding or bruising  Side effects that usually do not require medical attention (report to your doctor or health care professional if they continue or are bothersome):  · blurred vision  · changes in appetite  · change in sex drive or performance  · headache  · increased sweating  · nausea  What may interact with this medicine?  Do not take this medicine with any of the following medications:  · certain medicines for fungal infections like fluconazole, itraconazole, ketoconazole, posaconazole,  voriconazole  · cisapride  · citalopram  · dofetilide  · dronedarone  · linezolid  · MAOIs like Carbex, Eldepryl, Marplan, Nardil, and Parnate  · methylene blue (injected into a vein)  · pimozide  · thioridazine  · ziprasidone  This medicine may also interact with the following medications:  · alcohol  · aspirin and aspirin-like medicines  · carbamazepine  · certain medicines for depression, anxiety, or psychotic disturbances  · certain medicines for migraine headache like almotriptan, eletriptan, frovatriptan, naratriptan, rizatriptan, sumatriptan, zolmitriptan  · certain medicines for sleep  · certain medicines that treat or prevent blood clots like warfarin, enoxaparin, dalteparin  · cimetidine  · diuretics  · fentanyl  · furazolidone  · isoniazid  · lithium  · metoprolol  · NSAIDs, medicines for pain and inflammation, like ibuprofen or naproxen  · other medicines that prolong the QT interval (cause an abnormal heart rhythm)  · procarbazine  · rasagiline  · supplements like Ha's wort, kava kava, valerian  · tramadol  · tryptophan  What if I miss a dose?  If you miss a dose, take it as soon as you can. If it is almost time for your next dose, take only that dose. Do not take double or extra doses.  Where should I keep my medicine?  Keep out of reach of children.  Store at room temperature between 15 and 30 degrees C (59 and 86 degrees F). Throw away any unused medicine after the expiration date.  What should I tell my health care provider before I take this medicine?  They need to know if you have any of these conditions:  · bipolar disorder or a family history of bipolar disorder  · diabetes  · glaucoma  · heart disease  · kidney or liver disease  · receiving electroconvulsive therapy  · seizures (convulsions)  · suicidal thoughts, plans, or attempt by you or a family member  · an unusual or allergic reaction to escitalopram, the related drug citalopram, other medicines, foods, dyes, or  preservatives  · pregnant or trying to become pregnant  · breast-feeding  What should I watch for while using this medicine?  Tell your doctor if your symptoms do not get better or if they get worse. Visit your doctor or health care professional for regular checks on your progress. Because it may take several weeks to see the full effects of this medicine, it is important to continue your treatment as prescribed by your doctor.  Patients and their families should watch out for new or worsening thoughts of suicide or depression. Also watch out for sudden changes in feelings such as feeling anxious, agitated, panicky, irritable, hostile, aggressive, impulsive, severely restless, overly excited and hyperactive, or not being able to sleep. If this happens, especially at the beginning of treatment or after a change in dose, call your health care professional.  You may get drowsy or dizzy. Do not drive, use machinery, or do anything that needs mental alertness until you know how this medicine affects you. Do not stand or sit up quickly, especially if you are an older patient. This reduces the risk of dizzy or fainting spells. Alcohol may interfere with the effect of this medicine. Avoid alcoholic drinks.  Your mouth may get dry. Chewing sugarless gum or sucking hard candy, and drinking plenty of water may help. Contact your doctor if the problem does not go away or is severe.  NOTE:This sheet is a summary. It may not cover all possible information. If you have questions about this medicine, talk to your doctor, pharmacist, or health care provider. Copyright© 2017 [employer]

## 2017-11-28 NOTE — LETTER
November 28, 2017                 [facility]  Internal Medicine  [address] [place] [id number]  Phone: [phone]  Fax: [phone]   November 28, 2017     Patient: Marsha Marina   YOB: 1985   Date of Visit: 11/28/2017       To Whom it May Concern:    Marsha Marina was seen in my clinic on 11/28/2017. Please excuse from work until further notice.     If you have any questions or concerns, please don't hesitate to call.    Sincerely,         Sonia Castorena, DO

## 2017-11-29 LAB — ANA SER QL IF: NORMAL

## 2017-11-29 NOTE — PROGRESS NOTES
Marsha Marina  31 y.o. Black or  female    Chief Complaint   Patient presents with    Anxiety    Depression     HPI: Presents to the clinic with complaint of anxiety and depression. She has been seeing a counselor and it was recommended she start on medication. She has a lot of stress due to having a demanding job (she is a Vice Principle at a school). She cries daily. She has no energy and does not want to do anything. She plans to leave her job because of the effect it is having on her life.     Past Medical History:   Diagnosis Date    Abnormal Pap smear of vagina     10 years ago//had colpo with normal results//    Asthma     Disorder of kidney and ureter     Genital herpes     Hypertension     SLE (systemic lupus erythematosus)      Medications:   Reviewed    Allergies:  Review of patient's allergies indicates:   Allergen Reactions    Aspartame Anaphylaxis    Codeine Nausea And Vomiting     Bad Headache    Morphine Nausea And Vomiting     Bad Headache     PHYSICAL EXAM:  VITAL SIGNS: Reviewed  GENERAL: Alert and oriented, no acute distress  HEART: Regular rate   LUNGS: Respirations unlabored  PSYCH: Depressed; crying throughout interview    ASSESSMENT/PLAN:  Marsha was seen today for anxiety and depression.    Diagnoses and all orders for this visit:    Anxiety  -     escitalopram oxalate (LEXAPRO) 10 MG tablet; Take 1 tablet (10 mg total) by mouth once daily. Discussed medication risks and benefits.   -     Recommend continued counseling.     Moderate single current episode of major depressive disorder  -     escitalopram oxalate (LEXAPRO) 10 MG tablet; Take 1 tablet (10 mg total) by mouth once daily. Discussed medication risks and benefits.   -     Recommend continued counseling    Recommend leave of absence from work until next visit.     F/U in 3 weeks.

## 2017-11-30 ENCOUNTER — TELEPHONE (OUTPATIENT)
Dept: INTERNAL MEDICINE | Facility: CLINIC | Age: 32
End: 2017-11-30

## 2017-11-30 NOTE — TELEPHONE ENCOUNTER
----- Message from Courtney Gomez sent at 11/30/2017  1:41 PM CST -----  Contact: francisco real   Would like to consult with nurse to confirm a diagnosis. Please call back at [phone] ext [phone].        Thanks,  Courtney Gomez

## 2017-12-01 DIAGNOSIS — M32.14 LUPUS NEPHRITIS, ISN/RPS CLASS V: Primary | ICD-10-CM

## 2017-12-01 NOTE — PROGRESS NOTES
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

## 2017-12-11 ENCOUNTER — PATIENT MESSAGE (OUTPATIENT)
Dept: INTERNAL MEDICINE | Facility: CLINIC | Age: 32
End: 2017-12-11

## 2017-12-11 ENCOUNTER — LAB VISIT (OUTPATIENT)
Dept: LAB | Facility: HOSPITAL | Age: 32
End: 2017-12-11
Attending: INTERNAL MEDICINE
Payer: COMMERCIAL

## 2017-12-11 DIAGNOSIS — M32.14 LUPUS NEPHRITIS: ICD-10-CM

## 2017-12-11 DIAGNOSIS — F41.9 ANXIETY AND DEPRESSION: Primary | ICD-10-CM

## 2017-12-11 DIAGNOSIS — F32.A ANXIETY AND DEPRESSION: Primary | ICD-10-CM

## 2017-12-11 PROCEDURE — 81001 URINALYSIS AUTO W/SCOPE: CPT

## 2017-12-11 PROCEDURE — 84156 ASSAY OF PROTEIN URINE: CPT

## 2017-12-11 RX ORDER — SERTRALINE HYDROCHLORIDE 25 MG/1
25 TABLET, FILM COATED ORAL DAILY
Qty: 30 TABLET | Refills: 0 | Status: SHIPPED | OUTPATIENT
Start: 2017-12-11 | End: 2018-01-07 | Stop reason: SDUPTHER

## 2017-12-11 NOTE — TELEPHONE ENCOUNTER
Patient states that the lexapro makes her feel too tired to the point where she is having trouble getting up, wants to know if it will go away of if you could adjust the medication, please advise.    Patient also wants to know if she submits a claim to her supplemental insurance to get them to cover what her regular insurance did not pay, will fill out the necessary forms, please advise

## 2017-12-11 NOTE — TELEPHONE ENCOUNTER
Called and spoke with patient states that she has taken it at night, its just not helping, please advise    Patient states that she will fax paperwork for us to review

## 2017-12-11 NOTE — TELEPHONE ENCOUNTER
She can try taking the medication at night to see if that helps.   As far as paperwork is concerned, she'll need to bring it in/fax it to us for review.

## 2017-12-12 ENCOUNTER — PATIENT MESSAGE (OUTPATIENT)
Dept: OBSTETRICS AND GYNECOLOGY | Facility: CLINIC | Age: 32
End: 2017-12-12

## 2017-12-12 LAB
BILIRUB UR QL STRIP: NEGATIVE
CLARITY UR REFRACT.AUTO: CLEAR
COLOR UR AUTO: YELLOW
CREAT UR-MCNC: 109 MG/DL
GLUCOSE UR QL STRIP: NEGATIVE
HGB UR QL STRIP: NEGATIVE
KETONES UR QL STRIP: NEGATIVE
LEUKOCYTE ESTERASE UR QL STRIP: NEGATIVE
MICROSCOPIC COMMENT: ABNORMAL
NITRITE UR QL STRIP: NEGATIVE
PH UR STRIP: 6 [PH] (ref 5–8)
PROT UR QL STRIP: NEGATIVE
PROT UR-MCNC: 17 MG/DL
PROT/CREAT RATIO, UR: 0.16
RBC #/AREA URNS AUTO: 9 /HPF (ref 0–4)
SP GR UR STRIP: 1.02 (ref 1–1.03)
SQUAMOUS #/AREA URNS AUTO: 0 /HPF
URN SPEC COLLECT METH UR: NORMAL
UROBILINOGEN UR STRIP-ACNC: NEGATIVE EU/DL
WBC #/AREA URNS AUTO: 0 /HPF (ref 0–5)

## 2017-12-13 ENCOUNTER — PATIENT OUTREACH (OUTPATIENT)
Dept: ADMINISTRATIVE | Facility: HOSPITAL | Age: 32
End: 2017-12-13

## 2017-12-13 ENCOUNTER — PATIENT MESSAGE (OUTPATIENT)
Dept: INTERNAL MEDICINE | Facility: CLINIC | Age: 32
End: 2017-12-13

## 2017-12-13 NOTE — TELEPHONE ENCOUNTER
Called and spoke with patient in regards to insurance paperwork that dr. Castorena filled out, wants to know if dr. Castorena could word her dx differently.     I informed patient that I would relay her message over to dr. Castorena and once she advises me I will contact her back    Verbalized understanding

## 2017-12-14 ENCOUNTER — TELEPHONE (OUTPATIENT)
Dept: INTERNAL MEDICINE | Facility: CLINIC | Age: 32
End: 2017-12-14

## 2017-12-14 NOTE — TELEPHONE ENCOUNTER
----- Message from Ting Ferrell sent at 12/13/2017  2:05 PM CST -----  Patient state she is would like to speak to nurse regarding anxiety. Please adv/call [phone].//thanks. cw

## 2017-12-14 NOTE — TELEPHONE ENCOUNTER
Called and spoke with patient   States that her medication is not helping  A message was sent to dr. Castorena for review  Dr. Castorena made change to medication

## 2017-12-26 ENCOUNTER — OFFICE VISIT (OUTPATIENT)
Dept: INTERNAL MEDICINE | Facility: CLINIC | Age: 32
End: 2017-12-26
Payer: COMMERCIAL

## 2017-12-26 VITALS
WEIGHT: 181.88 LBS | SYSTOLIC BLOOD PRESSURE: 102 MMHG | OXYGEN SATURATION: 98 % | DIASTOLIC BLOOD PRESSURE: 64 MMHG | TEMPERATURE: 98 F | BODY MASS INDEX: 32.23 KG/M2 | HEART RATE: 75 BPM | HEIGHT: 63 IN

## 2017-12-26 DIAGNOSIS — F41.9 ANXIETY: Primary | ICD-10-CM

## 2017-12-26 PROCEDURE — 99999 PR PBB SHADOW E&M-EST. PATIENT-LVL III: CPT | Mod: PBBFAC,,, | Performed by: INTERNAL MEDICINE

## 2017-12-26 PROCEDURE — 99213 OFFICE O/P EST LOW 20 MIN: CPT | Mod: S$GLB,,, | Performed by: INTERNAL MEDICINE

## 2017-12-26 NOTE — PROGRESS NOTES
Marsha Marina  32 y.o.  Black or  female    Chief Complaint   Patient presents with    Follow-up     3 week       HPI:   Presents to the clinic to follow up on anxiety. She was given time off from work and it has helped. She has been having panic attacks with the thought of returning. She is seeing a counselor. She is taking sertraline as prescribed. She denies side effects. She has not noticed a difference yet with the medication.     PMH: Reviewed    MEDS: Reviewed med card    ALLERGIES: Reviewed allergy card    PE: Reviewed vitals  GENERAL: Alert and oriented, no acute distress  HEART: Regular rate  LUNGS: Unlabored respirations  PSYCH: Mood and affect appropriate      ASSESSMENT/PLAN:    Marsha was seen today for follow-up.    Diagnoses and all orders for this visit:    Anxiety  -     Continue sertaline  -     Continue counseling  -     Leave time extended x 4 weeks    RTC: 4 weeks

## 2017-12-26 NOTE — LETTER
December 26, 2017               [facility]  Internal Medicine  [address] [place] [address]  Phone: [phone]  Fax: [phone]   December 26, 2017     Patient: Marsha Marina   YOB: 1985   Date of Visit: 12/26/2017       To Whom it May Concern:    Marsha Marina was seen in my clinic on 12/26/2017. She may return to work on 2/1/2018.    If you have any questions or concerns, please don't hesitate to call.    Sincerely,         Sonia Castorena, DO

## 2017-12-26 NOTE — PROGRESS NOTES
Subjective:       Patient ID: Marsha Marina is a 32 y.o. female.    Chief Complaint: Follow-up (3 week)    HPI  Review of Systems    Objective:      Physical Exam    Assessment:       1. Anxiety        Plan:       ***

## 2018-01-04 ENCOUNTER — PATIENT MESSAGE (OUTPATIENT)
Dept: OBSTETRICS AND GYNECOLOGY | Facility: CLINIC | Age: 33
End: 2018-01-04

## 2018-01-04 ENCOUNTER — PATIENT MESSAGE (OUTPATIENT)
Dept: NEPHROLOGY | Facility: CLINIC | Age: 33
End: 2018-01-04

## 2018-01-07 ENCOUNTER — PATIENT MESSAGE (OUTPATIENT)
Dept: INTERNAL MEDICINE | Facility: CLINIC | Age: 33
End: 2018-01-07

## 2018-01-07 DIAGNOSIS — F32.A ANXIETY AND DEPRESSION: ICD-10-CM

## 2018-01-07 DIAGNOSIS — F41.9 ANXIETY AND DEPRESSION: ICD-10-CM

## 2018-01-09 ENCOUNTER — PATIENT MESSAGE (OUTPATIENT)
Dept: OBSTETRICS AND GYNECOLOGY | Facility: CLINIC | Age: 33
End: 2018-01-09

## 2018-01-09 ENCOUNTER — PATIENT OUTREACH (OUTPATIENT)
Dept: ADMINISTRATIVE | Facility: HOSPITAL | Age: 33
End: 2018-01-09

## 2018-01-09 DIAGNOSIS — I10 HTN (HYPERTENSION), BENIGN: Primary | ICD-10-CM

## 2018-01-09 RX ORDER — AMLODIPINE BESYLATE 5 MG/1
5 TABLET ORAL DAILY
Qty: 30 TABLET | Refills: 11 | Status: SHIPPED | OUTPATIENT
Start: 2018-01-09 | End: 2018-02-05 | Stop reason: SDUPTHER

## 2018-01-09 RX ORDER — SERTRALINE HYDROCHLORIDE 25 MG/1
TABLET, FILM COATED ORAL
Qty: 30 TABLET | Refills: 3 | Status: SHIPPED | OUTPATIENT
Start: 2018-01-09 | End: 2018-01-29 | Stop reason: SINTOL

## 2018-01-09 NOTE — PROGRESS NOTES
Cayetano Avery,   Id like try a new blood pressure medicine. I've narrowed the nausea source a little more and i have the worst spells after taking the diacartism 360. But overall i feel awful after taking any dose of my meds. And I've tried with and without food. I've also tried various times of day. I've been taking my largest dose before bed and I wake up nauseous. Sometimes it last all day. I'm miserable.           Discussed with patient on the phone, she thinks her nausea is from Cardizem, she wants to discontinue this medication and try something different, prescription sent for amlodipine 5 mg daily and if needed we can increase this dose to 10 mg daily.  She will discontinue Cardizem.    Hussain Avery MD

## 2018-01-10 ENCOUNTER — PATIENT MESSAGE (OUTPATIENT)
Dept: NEPHROLOGY | Facility: CLINIC | Age: 33
End: 2018-01-10

## 2018-01-11 ENCOUNTER — OFFICE VISIT (OUTPATIENT)
Dept: INTERNAL MEDICINE | Facility: CLINIC | Age: 33
End: 2018-01-11
Payer: COMMERCIAL

## 2018-01-11 VITALS
HEIGHT: 63 IN | SYSTOLIC BLOOD PRESSURE: 114 MMHG | TEMPERATURE: 98 F | DIASTOLIC BLOOD PRESSURE: 64 MMHG | OXYGEN SATURATION: 98 % | WEIGHT: 180.31 LBS | BODY MASS INDEX: 31.95 KG/M2 | HEART RATE: 92 BPM

## 2018-01-11 DIAGNOSIS — L30.9 DERMATITIS: Primary | ICD-10-CM

## 2018-01-11 PROCEDURE — 99213 OFFICE O/P EST LOW 20 MIN: CPT | Mod: S$GLB,,, | Performed by: INTERNAL MEDICINE

## 2018-01-11 PROCEDURE — 99999 PR PBB SHADOW E&M-EST. PATIENT-LVL IV: CPT | Mod: PBBFAC,,, | Performed by: INTERNAL MEDICINE

## 2018-01-11 RX ORDER — TRIAMCINOLONE ACETONIDE 1 MG/G
CREAM TOPICAL 2 TIMES DAILY
Qty: 80 G | Refills: 0 | Status: SHIPPED | OUTPATIENT
Start: 2018-01-11 | End: 2018-02-26

## 2018-01-11 NOTE — PATIENT INSTRUCTIONS
Nonspecific Dermatitis  Dermatitis is a skin rash caused by something that touches the skin and makes it irritated and inflamed.  Your skin may be red, swollen, dry, and may be cracked. Blisters may form and ooze. The rash will itch.  Dermatitis can form on the face and neck, backs of hands, forearms, genitals, and lower legs. Dermatitis is not passed from person to person.  Talk with your health care provider about what may have caused the rash. Common things that cause skin allergies are metal in jewelry, plants like poison ivy or poison oak, and certain skin care products. You will need to avoid the source of your rash in the future to prevent it from coming back. In some cases, the cause of the dermatitis may not be found.  Treatment is done to relieve itching and prevent the rash from coming back. The rash should go away in a few days to a few weeks.  Home care  The health care provider may prescribe medications to relieve swelling and itching. Follow all instructions when using these medications.  · Avoid anything that heats up your skin, such as hot showers or baths, or direct sunlight. This can make itching worse.  · Stay away from whatever you think caused the rash.  · Bathe in warm, not hot, water. Apply a moisturizing lotion after bathing to prevent dry skin.  · Avoid skin irritants such as wool or silk clothing, grease, oils, harsh soaps, and detergents.  · Apply cold compresses to soothe your sores to help relieve your symptoms. Do this for 30 minutes 3 to 4 times a day. You can make a cold compress by soaking a cloth in cold water. Squeeze out excess water. You can add colloidal oatmeal to the water to help reduce itching. For severe itching in a small area, apply an ice pack wrapped in a thin towel. Do this for 20 minutes 3 to 4 times a day.  · You can also help relieve large areas of itching by taking a lukewarm bath with colloidal oatmeal added to the water.  · Use hydrocortisone cream for redness  and irritation, unless another medicine was prescribed. You can also use benzocaine anesthetic cream or spray.  · Use oral diphenhydramine to help reduce itching. This is an antihistamine you can buy at drug and grocery stores. It can make you sleepy, so use lower doses during the daytime. Or you can use loratadine. This is an antihistamine that will not make you sleepy. Dont use diphenhydramine if you have glaucoma or have trouble urinating because of an enlarged prostate.  · Wash your hands or use an antibacterial gel often to prevent the spread of the rash.  Follow-up care  Follow up with your health care provider. Make an appointment with your health care provider if your symptoms do not get better in the next 1 to 2 weeks.  When to seek medical advice  Call your health care provider right away if any of these occur:  · Spreading of the rash to other parts of your body  · Severe swelling of your face, eyelids, mouth, throat or tongue  · Trouble urinating due to swelling in the genital area  · Fever of 100.4°F (38°C) or higher  · Redness or swelling that gets worse  · Pain that gets worse  · Foul-smelling fluid leaking from the skin  · Yellow-brown crusts on the open blisters  · Joint pain   Date Last Reviewed: 7/23/2014  © 4735-6859 [employer]. [address] [place] [address], [place] [address]. All rights reserved. This information is not intended as a substitute for professional medical care. Always follow your healthcare professional's instructions.

## 2018-01-29 ENCOUNTER — OFFICE VISIT (OUTPATIENT)
Dept: INTERNAL MEDICINE | Facility: CLINIC | Age: 33
End: 2018-01-29
Payer: COMMERCIAL

## 2018-01-29 ENCOUNTER — PATIENT MESSAGE (OUTPATIENT)
Dept: OTOLARYNGOLOGY | Facility: CLINIC | Age: 33
End: 2018-01-29

## 2018-01-29 ENCOUNTER — TELEPHONE (OUTPATIENT)
Dept: INTERNAL MEDICINE | Facility: CLINIC | Age: 33
End: 2018-01-29

## 2018-01-29 VITALS
BODY MASS INDEX: 31.36 KG/M2 | DIASTOLIC BLOOD PRESSURE: 80 MMHG | WEIGHT: 177 LBS | OXYGEN SATURATION: 99 % | HEIGHT: 63 IN | HEART RATE: 91 BPM | SYSTOLIC BLOOD PRESSURE: 114 MMHG | TEMPERATURE: 98 F

## 2018-01-29 DIAGNOSIS — F41.9 ANXIETY: Primary | ICD-10-CM

## 2018-01-29 DIAGNOSIS — F32.A MILD DEPRESSION: ICD-10-CM

## 2018-01-29 DIAGNOSIS — G47.9 DIFFICULTY SLEEPING: ICD-10-CM

## 2018-01-29 PROCEDURE — 99214 OFFICE O/P EST MOD 30 MIN: CPT | Mod: S$GLB,,, | Performed by: INTERNAL MEDICINE

## 2018-01-29 PROCEDURE — 99999 PR PBB SHADOW E&M-EST. PATIENT-LVL III: CPT | Mod: PBBFAC,,, | Performed by: INTERNAL MEDICINE

## 2018-01-29 PROCEDURE — 3008F BODY MASS INDEX DOCD: CPT | Mod: S$GLB,,, | Performed by: INTERNAL MEDICINE

## 2018-01-29 RX ORDER — PAROXETINE HYDROCHLORIDE 20 MG/1
20 TABLET, FILM COATED ORAL EVERY MORNING
Qty: 30 TABLET | Refills: 0 | Status: SHIPPED | OUTPATIENT
Start: 2018-01-29 | End: 2018-02-26 | Stop reason: SDUPTHER

## 2018-01-29 NOTE — LETTER
January 29, 2018                 [facility]  Internal Medicine  [address] [place] [address]  Phone: [phone]  Fax: [phone]   January 29, 2018     Patient: Marsha Marina   YOB: 1985   Date of Visit: 1/29/2018       To Whom it May Concern:    Marsha Marina was seen in my clinic on 1/29/2018. She may return to work on 3/12/2018.    If you have any questions or concerns, please don't hesitate to call.    Sincerely,         Sonia Castorena, DO

## 2018-01-29 NOTE — MEDICAL/APP STUDENT
Ms Marina is a 33y/o F here for 4wk follow up on starting sertraline.    She is currently on 25mg. She states that her concentration has improved a great deal. She is still having crying spells and believes she is more sensitive now. She states she felt an improvement within a week of starting the sertraline but now feels the same as she has before she started the medication. She has also been having difficulty falling asleep about 6 times this month. She does not consume caffeine and practices good sleep hygeine.     Assessment:  Anxiety and depression with difficulty sleeping    Plan  -Offer pt option of increasing sertraline dosage to 50mg and taking zolpidem 5mg PRN for her sleep problems.   -continue to see counselor  -f/u in 2 weeks

## 2018-01-31 ENCOUNTER — OFFICE VISIT (OUTPATIENT)
Dept: OTOLARYNGOLOGY | Facility: CLINIC | Age: 33
End: 2018-01-31
Payer: COMMERCIAL

## 2018-01-31 ENCOUNTER — PATIENT MESSAGE (OUTPATIENT)
Dept: NEPHROLOGY | Facility: CLINIC | Age: 33
End: 2018-01-31

## 2018-01-31 VITALS — HEIGHT: 62 IN | WEIGHT: 177 LBS | BODY MASS INDEX: 32.57 KG/M2

## 2018-01-31 DIAGNOSIS — J34.3 NASAL TURBINATE HYPERTROPHY: Primary | ICD-10-CM

## 2018-01-31 DIAGNOSIS — J34.89 NASAL OBSTRUCTION: ICD-10-CM

## 2018-01-31 DIAGNOSIS — I10 HTN (HYPERTENSION), BENIGN: ICD-10-CM

## 2018-01-31 PROCEDURE — 3008F BODY MASS INDEX DOCD: CPT | Mod: S$GLB,,, | Performed by: OTOLARYNGOLOGY

## 2018-01-31 PROCEDURE — 99214 OFFICE O/P EST MOD 30 MIN: CPT | Mod: S$GLB,,, | Performed by: OTOLARYNGOLOGY

## 2018-01-31 PROCEDURE — 99999 PR PBB SHADOW E&M-EST. PATIENT-LVL II: CPT | Mod: PBBFAC,,, | Performed by: OTOLARYNGOLOGY

## 2018-01-31 RX ORDER — OXYCODONE AND ACETAMINOPHEN 5; 325 MG/1; MG/1
1 TABLET ORAL EVERY 4 HOURS PRN
Qty: 10 TABLET | Refills: 0 | Status: SHIPPED | OUTPATIENT
Start: 2018-01-31

## 2018-01-31 RX ORDER — MUPIROCIN 20 MG/G
OINTMENT TOPICAL 2 TIMES DAILY
Qty: 22 G | Refills: 0 | Status: SHIPPED | OUTPATIENT
Start: 2018-01-31 | End: 2018-02-10

## 2018-01-31 RX ORDER — DIAZEPAM 5 MG/1
5 TABLET ORAL EVERY 6 HOURS PRN
Qty: 1 TABLET | Refills: 0 | Status: SHIPPED | OUTPATIENT
Start: 2018-01-31 | End: 2018-02-28

## 2018-01-31 NOTE — PROGRESS NOTES
Marsha Marina  32 y.o.  Black or  female    Chief Complaint   Patient presents with    Follow-up       HPI:  Presents to the clinic to follow up on anxiety. She has been having worsened anxiety with the thought of returning to work. She initially felt an improvement with sertraline but now she is having sadness and crying spells. She continues to see the counselor.   She is also having difficulty sleeping. She thinks it is due to the medication because this was not a problem prior to starting sertraline.      PMH: Reviewed    MEDS: Reviewed med card    ALLERGIES: Reviewed allergy card    PE: Reviewed vitals  GENERAL: Alert and oriented, no acute distress  HEART: Regular rate  LUNGS: Unlabored respirations  PSYCH: Mood and affect appropriate      ASSESSMENT/PLAN:    Marsha was seen today for follow-up.    Diagnoses and all orders for this visit:    Anxiety        -     Switch to paroxetine (PAXIL) 20 MG tablet; Take 1 tablet (20 mg total) by mouth every morning. Discussed medication risks and benefits.         -     Recommend continued counseling    Mild depression  -     paroxetine (PAXIL) 20 MG tablet; Take 1 tablet (20 mg total) by mouth every morning.  -     Recommend continued counseling     Difficulty sleeping  -     Paroxetine may help. Advised to take medication in the evening.     Advised not to return to work just yet. Excuse provided.     RTC: As needed

## 2018-01-31 NOTE — PATIENT INSTRUCTIONS
Take 2 percocet and 1 valium 45 min prior to appointment        Understanding Nasal Anatomy: Inside View  There is a lot happening under the surface of the nose. The bone and cartilage under the skin give the nose most of its size and shape. Other structures inside and behind the nose help you breathe. Learning the anatomy of the nose can help you further understand how the nose works.                   Understanding Nasal Obstruction (Septal Deviation and/or Turbinate Hypertrophy    Nasal obstruction may due to a variety of reasons.  The most common is a combination of factors.  Patient have a deviated nasal septum.  The dividing wall between the right and left nasal cavities is no longer straight.  The may be from trauma (even minor trauma) or simply a product of how you grew.  Also, the nasal turbinates may be enlarged.  This can be because the bone beneath is large or angled incorrectly or because the soft tissue around the turbinate is swollen (frequently from allergies).          Understanding Nasal Allergies  Nasal allergies (also called allergic rhinitis) are a common health problem. They may be seasonal.This means they cause symptoms only at certain times of the year. Or they may be perennial.This means they cause symptoms all year long. Other health problems, such as asthma, often occur along with allergies as well.    What Is an allergic reaction?  An allergy is a reaction to a substance called an allergen. Common allergens include:  · Wind-borne pollen  · Mold  · Dust mites  · Furry and feathered animals  · Cockroaches  Normally, allergens are harmless. But when a person has allergies, their body thinks they are harmful. Their body then attacks allergens with antibodies. Antibodies are attached to special cells called mast cells. Allergens stick to the antibodies. This makes the mast cells release histamine and other chemicals. This is an allergic reaction. The chemicals irritate nearby nasal tissue.  This causes nasal allergy symptoms.  Common nasal allergy symptoms  Allergies can cause nasal tissue to swell. This makes the air passages smaller. The nose may feel stuffed up. The nose may also make extra mucus, which can plug the nasal passages or drip out of the nose. Mucus can drip down the back of the throat (postnasal drip) as well. Sinus tissue can swell. This may cause pain and headache. Common allergy symptoms include:  · Runny nose with clear, watery discharge  · Stuffy nose (nasal congestion)  · Drainage down your throat (postnasal drip)  · Sneezing  · Red, watery eyes  · Itchy nose, eyes, ears, and throat  · Plugged-up ears (ear congestion)  · Sore throat  · Coughing  · Sinus pain and swelling  · Headache  It may not be allergies  Other health problems can cause symptoms like those of nasal allergies. These include:  · Nonallergic rhinitis and viruses such as colds  · Irritants and pollutants, such as strong odors or smoke  · Certain medications  · Changes in the weather         Understanding Sinusitis      What is a sinus infection?  A sinus infection, or sinusitis ([id number]-[id number]), is a swelling of the lining in the sinuses. Acute sinusitis lasts for less than four weeks. Chronic sinusitis lasts for more than 12 weeks.    What causes sinus infections?  The most common cause is a virus, such as the common cold. When you catch a cold, your mucus becomes thick and sticky, and doesn't drain well. Bacteria can grow in the mucus trapped in your sinuses. This can lead to a bacterial sinus infection.  For patient with symptoms less than a week, 80% of the infections are due to viruses and will resolve without antibiotics.  Infections lasting longer than 1 week are more likely to be due to bacteria, and antibiotics (sometimes with steroids) may be needed to stop the infection.  Patients who have symptoms lasting several weeks may need more aggressive medical therapy and/or procedures to restore healthy  sinuses.    Who gets them?  Anyone can get a sinus infection, but people with nasal allergies, hay fever, or asthma have an increased risk. Other risk factors include exposure to cigarette smoke, nasal polyps (YULIANA-ips), and changes in pressure (such as during flying or scuba diving). Sinus infections can also be caused by a deviated septum, which is when the part of your nose that separates the nostrils is out of place.    What are the symptoms?  Headache  Pain or pressure in the forehead, cheeks, nose, or between the eyes  Fever  Nasal congestion and runny nose  Cough that may be worse at night  Sore throat  Decreased sense of smell and taste  Tiredness  Bad breath  How are they treated?  Only about two out of 100 people with cold symptoms will get a bacterial sinus infection. Antibiotics can treat bacterial infections, but not viral infections. Most people do not need antibiotics. Having a green or yellow nasal discharge does not necessarily mean that you need antibiotics.    If you have had symptoms for less than one week, try the following:  Drink plenty of fluids to keep your mucus thin  Sleep with your head propped up, or with the pain-free side of your face on the pillow  Inhale steam three or four times a day (for example, sit in the bathroom with a hot shower running)  Use a salt-water nasal spray or a nasal cup to loosen mucus  Use over-the-counter pain medicine to help with pain and headaches  Put a warm, wet towel against your face to help with pain  Take an over-the-counter decongestant to help your sinuses drain, but avoid antihistamines, which make mucus thick.      Chronic sinusitis is a common condition in which the cavities around nasal passages (sinuses) become inflamed and swollen -- for at least eight weeks, despite treatment attempts.    Also known as chronic rhinosinusitis, this condition interferes with drainage and causes mucus to build up. If you have chronic sinusitis, it may be difficult  to breathe through your nose. The area around your eyes and face may feel swollen, and you may have throbbing facial pain or a headache.    Chronic sinusitis may be caused by an infection, but it can also be caused by growths in the sinuses (nasal polyps) or by a deviated nasal septum. Chronic sinusitis most commonly affects young and middle-aged adults, but it also can affect children.  Most patients with chronic sinusitis with need to have the nose inspected with a camera to see if there is any obvious blockage of the sinus opening or pus draining from the sinuses.   However; this usually does now allow your doctor to look INTO the sinuses.  The best method for evaluating this area is a CT scan.  The combination of the two is the best method for evaluating chronic sinusitis from chronic severe allergies.      What about Smoking?  The respiratory tract is lined with special cells that move mucus out of the sinus cavities by moving in a sweeping action.      When the cells are exposed to smoke, the cilia become paralyzed.   This results in a chronic buildup of mucus in the sinus cavities that results in increased congestion and inflammation over time.  Because of this, treatment of sinus disease without smoking cessation is very rarely effective.       PLEASE PERFORM SINUS RINSES 3-4 TIMES DAILY after procedure.          DIRECTIONS FOR SINUS SALINE RINSE To see demonstration: Enter http://www.youOnitube.com/watch?v=[email or web address] into the browser address box, or go to You tube, and under the search box, enter sinus rinse. Click on [facility] Sinus Rinse Video    Step 1    Step 1 Please wash your hands. Fill the clean bottle with the designated volume of warm distilled water, filtered water or previously boiled water. You may warm the water in a microwave but we recommend that you warm it in increments of five seconds. This is to avoid excessive heating of the water and damage to the device or scalding your nasal  passage.    Step 2    Step 2 Cut the SINUS RINSE mixture packet at the corner and pour its contents into the bottle. Tighten the cap & tube on the bottle securely, place one finger over the tip of the cap and shake the bottle gently to dissolve the mixture.      Step 3    Step 3 Standing in front of a sink, bend forward to your comfort level and tilt your head down. Keeping your mouth open without holding your breath, place cap snugly against your nasal passage and SQUEEZE BOTTLE GENTLY until the solution starts draining from the OPPOSITE nasal passage or from your mouth. Keep squeezing the bottle GENTLY until at least 1/4 to 1/2 (60 to 120 mL) of the bottle is used for a proper rinse. Do not swallow the solution.    Step 4    Blow your nose gently, without pinching your nose completely because this will apply pressure on the    eardrums. If tolerable, sniff in any residual solution remaining in the nasal passage once or twice prior to    blowing your nose as this may clean out the posterior nasopharyngeal area (the area at the back of your    nasal passage). Some solution will reach the back of the throat, so please spit it out. To help improve    drainage of any residual solution, blow your nose gently while tilting your head to the opposite side of    the nasal passage that you just rinsed.    Step 5    Now repeat steps 3 & 4 for your other nasal passage.    Step 6 Air dry the Sinus Rinse bottle, cap, and tube on a clean paper towel, a glass plate to store the bottle cap and tube. If there is any solution leftover, please discard it. We recommend you make a fresh solution each time you rinse. Rinse 5 times each day, OR as directed by your physician. Warnings: DO NOT RINSE IF NASAL PASSAGE IS COMPLETELY BLOCKED OR IF YOU HAVE AN EAR INFECTION OR BLOCKED EARS. If you have had ear surgery, please contact your physician prior to irrigation. If you experience any pressure in your ears, stop the rinse and get further  directions from your physician or contact our office during regular business hours. To avoid any ear discomfort: Heat the solution to lukewarm, do not use hot, boiling or cold water. Keep your mouth open. Do not hold your breath and if possible make the sound MARGIE....MARGIE... Make sure to take the position as shown. Gently squeeze 1/4 of the bottle at a time (60mL / 2 ounces). Stop the rinse if you feel any solution sensation near the ears. You may rinse with a partially blocked nasal passage. Please do not use for any other purposes. Please rinse at least ONE HOUR PRIOR to bedtime, in order to avoid any residual solution dripping in the throat.    >> Before using the SINUS RINSE kit, please inspect the cap, tube and bottle carefully for wear and    tear. If any of the components appear discolored or cracked, please contact [employer] to obtain a    replacement. You must follow the cleaning instructions provided in this brochure or cleaning instruction    card prior to each use.    >> The SINUS RINSE bottle and mixture are to be used only for nasalirrigation. Do not use for any other    purposes.    >> We recommend that you use the rinse ONE HOUR PRIOR to bedtime in order to avoid any residual    solution dripping in the throat.    Tips to avoid ear discomfort while rinsing    If you have had ear surgery, please contact your physician prior to irrigation. Do not use if you have an    ear infection or blocked ears. Rinse with lukewarm water. Keep your mouth open. Do not hold your    breath while rinsing. While rinsing, make sure to tilt your. Gently squeeze the bottle while rinsing; do    not squeeze the bottle very forcefully. Stop the rinse if you feel a sensation of fluid near your ears.    Tips to avoid unexpected drainage after rinsing    In rare situations, especially if you have had sinus surgery, the saline solution can pool in the sinus    cavities and nasal passages and then drip from your nostrils hours after  rinsing. To avoid this harmless    but annoying inconvenience, take one extra step after rinsing: lean forward, tilt your head sideways and    gently blow your nose. Then, tilt your head to the other side and blow again. You may need to repeat    this several times. This will help rid your nasal passages of any excess mucus and remaining saline    solution. If you find yourself experiencing delayed drainage often, do not rinse right before leaving your    house or going to bed.

## 2018-01-31 NOTE — PROGRESS NOTES
Subjective:       Patient ID: Marsha Marina is a 31 y.o. female.    Chief Complaint:     Persistent nasal obstruction R>L.  She was treated for sinusitis a few months ago.  CT demonstrated resolution of infection.  She has persistent pressure and congestion on the right.  She has tried flonase for a month using BID and found it mildly helpful.  Right is severe.  Left mild.  Quality- restricted airflow, pressure.  Has been present for at least one year.  No drainage, bleeding.       Review of Systems:  -     Allergic/Immunologic: is allergic to aspartame; cardizem [diltiazem hcl]; codeine; and morphine..  -     Constitutional: Current temp:            Objective:      Physical Exam   Constitutional: She is oriented to person, place, and time. She appears well-developed and well-nourished. No distress.   HENT:   Head: Normocephalic and atraumatic.   Right Ear: Tympanic membrane, external ear and ear canal normal.   Left Ear: Tympanic membrane, external ear and ear canal normal.   Nose:  No masses/lesions of external nose.  The nasal mucosa is without erythema or discharge, the nasal septum has no significant deviation and no perforation appreciated , and the inferior turbinates have significant hypertrophy R>L  Mouth/Throat: Uvula is midline, oropharynx is clear and moist and mucous membranes are normal. Mucous membranes are not pale and not dry. Normal dentition. No oropharyngeal exudate or posterior oropharyngeal erythema. Tonsils are 2+ on the right. Tonsils are 2+ on the left. No tonsillar exudate (no tonsiliths visualized today).   Eyes: Conjunctivae, EOM and lids are normal. Pupils are equal, round, and reactive to light. Right eye exhibits no chemosis. Left eye exhibits no chemosis. Right conjunctiva is not injected. Left conjunctiva is not injected. No scleral icterus. Right eye exhibits normal extraocular motion and no nystagmus. Left eye exhibits normal extraocular motion and no nystagmus.   Neck:  Trachea normal and phonation normal. No tracheal tenderness present. No tracheal deviation present. No thyroid mass and no thyromegaly present.   Cardiovascular: Intact distal pulses.    Pulmonary/Chest: Effort normal. No stridor. No respiratory distress.   Abdominal: She exhibits no distension.   Lymphadenopathy:        Head (right side): No submental, no submandibular, no preauricular, no posterior auricular and no occipital adenopathy present.        Head (left side): No submental, no submandibular, no preauricular, no posterior auricular and no occipital adenopathy present.     She has no cervical adenopathy.   Neurological: She is alert and oriented to person, place, and time. No cranial nerve deficit.   Skin: Skin is warm and dry. No rash noted. No erythema.   Psychiatric: She has a normal mood and affect. Her behavior is normal.       CT sinus June 6, 2017 independently reviewed              CT sinus August 2017- images independently reviewed            Assessment:        Resolved infection    Allergy testing negative for environmental allergies    CBC with no evidence of eosinophilia    Nasal obstruction/turbinate hypertrophy       Plan:         -NASAL OBSTRUCTION:  Marsha has persistent nasal obstruction and been on adequate medical therapy including topical steroids and antihistamines.  There is not evidence on nasal endoscopy of septal deviation.  There is evidence on nasal endoscopy of inferior turbinate hypertrophy.  My recommendation is SMR of turbinates.    We discussed at length the risk of sinus surgery including, but not limited to, recurrence of disease, bleeding, infection, septal perforation, tooth or cheek numbness, vision changes, orbital injury, CSF leak and changes in smell.  The patient had opportunity to ask questions and I answered all of them to their satisfaction.  We will proceed as planned.

## 2018-02-05 RX ORDER — AMLODIPINE BESYLATE 5 MG/1
5 TABLET ORAL 2 TIMES DAILY
Qty: 60 TABLET | Refills: 11 | Status: SHIPPED | OUTPATIENT
Start: 2018-02-05 | End: 2018-02-13 | Stop reason: SDUPTHER

## 2018-02-06 ENCOUNTER — PATIENT MESSAGE (OUTPATIENT)
Dept: OBSTETRICS AND GYNECOLOGY | Facility: CLINIC | Age: 33
End: 2018-02-06

## 2018-02-13 DIAGNOSIS — I10 HTN (HYPERTENSION), BENIGN: ICD-10-CM

## 2018-02-13 RX ORDER — LABETALOL 200 MG/1
200 TABLET, FILM COATED ORAL EVERY 12 HOURS
Qty: 180 TABLET | Refills: 3 | Status: SHIPPED | OUTPATIENT
Start: 2018-02-13 | End: 2018-07-30 | Stop reason: SDUPTHER

## 2018-02-13 RX ORDER — AMLODIPINE BESYLATE 5 MG/1
10 TABLET ORAL DAILY
Qty: 90 TABLET | Refills: 3 | Status: SHIPPED | OUTPATIENT
Start: 2018-02-13 | End: 2018-07-30 | Stop reason: SDUPTHER

## 2018-02-13 RX ORDER — ERGOCALCIFEROL 1.25 MG/1
1 CAPSULE ORAL WEEKLY
COMMUNITY
Start: 2018-01-07 | End: 2018-02-13 | Stop reason: SDUPTHER

## 2018-02-13 RX ORDER — ERGOCALCIFEROL 1.25 MG/1
50000 CAPSULE ORAL WEEKLY
Qty: 12 CAPSULE | Refills: 3 | Status: SHIPPED | OUTPATIENT
Start: 2018-02-13 | End: 2018-07-30 | Stop reason: SDUPTHER

## 2018-02-14 DIAGNOSIS — L30.9 DERMATITIS: ICD-10-CM

## 2018-02-15 RX ORDER — TRIAMCINOLONE ACETONIDE 1 MG/G
CREAM TOPICAL 2 TIMES DAILY
Qty: 80 G | Refills: 0 | OUTPATIENT
Start: 2018-02-15 | End: 2018-03-01

## 2018-02-19 ENCOUNTER — TELEPHONE (OUTPATIENT)
Dept: NEPHROLOGY | Facility: CLINIC | Age: 33
End: 2018-02-19

## 2018-02-19 NOTE — TELEPHONE ENCOUNTER
----- Message from Yessi Gambino sent at 2/19/2018  3:12 PM CST -----  Contact: [employer]  would like to put amlodipine at 90 days..[phone]- ref [facility] [phone]

## 2018-02-20 ENCOUNTER — PATIENT MESSAGE (OUTPATIENT)
Dept: OTOLARYNGOLOGY | Facility: CLINIC | Age: 33
End: 2018-02-20

## 2018-02-21 ENCOUNTER — PROCEDURE VISIT (OUTPATIENT)
Dept: OTOLARYNGOLOGY | Facility: CLINIC | Age: 33
End: 2018-02-21
Payer: COMMERCIAL

## 2018-02-21 ENCOUNTER — PATIENT MESSAGE (OUTPATIENT)
Dept: INTERNAL MEDICINE | Facility: CLINIC | Age: 33
End: 2018-02-21

## 2018-02-21 VITALS
WEIGHT: 173.31 LBS | BODY MASS INDEX: 31.69 KG/M2 | SYSTOLIC BLOOD PRESSURE: 124 MMHG | HEART RATE: 68 BPM | TEMPERATURE: 97 F | DIASTOLIC BLOOD PRESSURE: 86 MMHG

## 2018-02-21 DIAGNOSIS — J34.3 NASAL TURBINATE HYPERTROPHY: ICD-10-CM

## 2018-02-21 DIAGNOSIS — J34.89 NASAL OBSTRUCTION: ICD-10-CM

## 2018-02-21 PROCEDURE — 30140 RESECT INFERIOR TURBINATE: CPT | Mod: 50,S$GLB,, | Performed by: OTOLARYNGOLOGY

## 2018-02-21 NOTE — PROCEDURES
Procedures       SUBMUCOSAL RESECTION OF INFERIOR TURBINATES    Patient: Marsha Marina [id number], :1985  Procedure date:2018  Patient's medications, allergies, past medical, surgical, social and family histories were reviewed and updated as appropriate.  Chief Complaint:  Follow-up (In office Turbinate Reduction )    HPI:  Marsha is a 32 y.o. female with nasal obstruction and bilateral inferior turbinate hypertrophy despite medical therapy.  Consent was obtained for submucosal resection of inferior turbinates.    Procedure: Risks, benefits, and alternatives of the procedure were discussed with the patient, and the patient consented to bilateral submucosal resection of inferior turbinates.  The nasal cavity was sprayed with a topical decongestant and 2% pontocaine.  I then placed 5% pontocaine soaked plegets into the nasal cavities and allowed these to anesthetize the nasal cavity.  I then injected 1.5 cc of lidocaine 1% with epinephrine into the inferior turbinates bilaterally.    The endoscope was passed into each nostril and each nasal cavity was visualized.  On each side the nasal cavity turbinates, and septum were examined with the findings of turbinate hypertrophy.   I began on the right. The [facility] 2mm turbinate blade was used to make an incision in the head of the inferior turbinate.  The instrument was then tunneled submucosally along the inferior turbinate and the instrument was activated.  Several passes superiorly and inferiorly were made until there was significant reduction of the tissue and a patent nasal passageway. The nasal cavity was suctioned free of blood and saline and found to be hemostatic.  The same procedure was performed on the left side.      At the end of the examination, the scope was removed. The patient tolerated the procedure well with no complications.       Assessment & Plan:  Marsha will follow up in 4 weeks or earlier if problems arise.  They will begin  using saline rinses to assist in clearance of blood and mucus within the nasal cavities.

## 2018-02-21 NOTE — PATIENT INSTRUCTIONS
PLEASE PERFORM SINUS RINSES 3-4 TIMES DAILY UNTIL YOUR NEXT VISIT.          DIRECTIONS FOR SINUS SALINE RINSE To see demonstration: Enter http://www.youtube.com/watch?v=[email or web address] into the browser address box, or go to You tube, and under the search box, enter sinus rinse. Click on [facility] Sinus Rinse Video    Step 1    Step 1 Please wash your hands. Fill the clean bottle with the designated volume of warm distilled water, filtered water or previously boiled water. You may warm the water in a microwave but we recommend that you warm it in increments of five seconds. This is to avoid excessive heating of the water and damage to the device or scalding your nasal passage.    Step 2    Step 2 Cut the SINUS RINSE mixture packet at the corner and pour its contents into the bottle. Tighten the cap & tube on the bottle securely, place one finger over the tip of the cap and shake the bottle gently to dissolve the mixture.      Step 3    Step 3 Standing in front of a sink, bend forward to your comfort level and tilt your head down. Keeping your mouth open without holding your breath, place cap snugly against your nasal passage and SQUEEZE BOTTLE GENTLY until the solution starts draining from the OPPOSITE nasal passage or from your mouth. Keep squeezing the bottle GENTLY until at least 1/4 to 1/2 (60 to 120 mL) of the bottle is used for a proper rinse. Do not swallow the solution.    Step 4    Blow your nose gently, without pinching your nose completely because this will apply pressure on the    eardrums. If tolerable, sniff in any residual solution remaining in the nasal passage once or twice prior to    blowing your nose as this may clean out the posterior nasopharyngeal area (the area at the back of your    nasal passage). Some solution will reach the back of the throat, so please spit it out. To help improve    drainage of any residual solution, blow your nose gently while tilting your head to the opposite side  of    the nasal passage that you just rinsed.    Step 5    Now repeat steps 3 & 4 for your other nasal passage.    Step 6 Air dry the Sinus Rinse bottle, cap, and tube on a clean paper towel, a glass plate to store the bottle cap and tube. If there is any solution leftover, please discard it. We recommend you make a fresh solution each time you rinse. Rinse 5 times each day, OR as directed by your physician. Warnings: DO NOT RINSE IF NASAL PASSAGE IS COMPLETELY BLOCKED OR IF YOU HAVE AN EAR INFECTION OR BLOCKED EARS. If you have had ear surgery, please contact your physician prior to irrigation. If you experience any pressure in your ears, stop the rinse and get further directions from your physician or contact our office during regular business hours. To avoid any ear discomfort: Heat the solution to lukewarm, do not use hot, boiling or cold water. Keep your mouth open. Do not hold your breath and if possible make the sound MARGIE....MARGIE... Make sure to take the position as shown. Gently squeeze 1/4 of the bottle at a time (60mL / 2 ounces). Stop the rinse if you feel any solution sensation near the ears. You may rinse with a partially blocked nasal passage. Please do not use for any other purposes. Please rinse at least ONE HOUR PRIOR to bedtime, in order to avoid any residual solution dripping in the throat.    >> Before using the SINUS RINSE kit, please inspect the cap, tube and bottle carefully for wear and    tear. If any of the components appear discolored or cracked, please contact [employer] to obtain a    replacement. You must follow the cleaning instructions provided in this brochure or cleaning instruction    card prior to each use.    >> The SINUS RINSE bottle and mixture are to be used only for nasalirrigation. Do not use for any other    purposes.    >> We recommend that you use the rinse ONE HOUR PRIOR to bedtime in order to avoid any residual    solution dripping in the throat.    Tips to avoid ear  discomfort while rinsing    If you have had ear surgery, please contact your physician prior to irrigation. Do not use if you have an    ear infection or blocked ears. Rinse with lukewarm water. Keep your mouth open. Do not hold your    breath while rinsing. While rinsing, make sure to tilt your. Gently squeeze the bottle while rinsing; do    not squeeze the bottle very forcefully. Stop the rinse if you feel a sensation of fluid near your ears.    Tips to avoid unexpected drainage after rinsing    In rare situations, especially if you have had sinus surgery, the saline solution can pool in the sinus    cavities and nasal passages and then drip from your nostrils hours after rinsing. To avoid this harmless    but annoying inconvenience, take one extra step after rinsing: lean forward, tilt your head sideways and    gently blow your nose. Then, tilt your head to the other side and blow again. You may need to repeat    this several times. This will help rid your nasal passages of any excess mucus and remaining saline    solution. If you find yourself experiencing delayed drainage often, do not rinse right before leaving your    house or going to bed.

## 2018-02-23 ENCOUNTER — TELEPHONE (OUTPATIENT)
Dept: INTERNAL MEDICINE | Facility: CLINIC | Age: 33
End: 2018-02-23

## 2018-02-23 NOTE — TELEPHONE ENCOUNTER
Called and spoke with patient  States that priyanka has not received paperwork from dr. Castorena  Advised patient that the form has been faxed but will refax form  Patient verbalized understanding

## 2018-02-23 NOTE — TELEPHONE ENCOUNTER
----- Message from Hardeep Austin sent at 2/23/2018  1:00 PM CST -----  Contact: Pt   Pt requested a callback will explain reason during callback..[phone] (home)

## 2018-02-23 NOTE — TELEPHONE ENCOUNTER
Called and spoke with patient  Requesting that her paperwork be sent to [facility]   Informed patient that we will refax form  Verbalized understanding

## 2018-02-26 ENCOUNTER — OFFICE VISIT (OUTPATIENT)
Dept: INTERNAL MEDICINE | Facility: CLINIC | Age: 33
End: 2018-02-26
Payer: COMMERCIAL

## 2018-02-26 VITALS
OXYGEN SATURATION: 99 % | BODY MASS INDEX: 32.46 KG/M2 | TEMPERATURE: 97 F | DIASTOLIC BLOOD PRESSURE: 90 MMHG | SYSTOLIC BLOOD PRESSURE: 138 MMHG | HEIGHT: 62 IN | WEIGHT: 176.38 LBS | HEART RATE: 80 BPM

## 2018-02-26 DIAGNOSIS — F32.A MILD DEPRESSION: ICD-10-CM

## 2018-02-26 DIAGNOSIS — F41.9 ANXIETY: Primary | ICD-10-CM

## 2018-02-26 PROCEDURE — 3008F BODY MASS INDEX DOCD: CPT | Mod: S$GLB,,, | Performed by: INTERNAL MEDICINE

## 2018-02-26 PROCEDURE — 99214 OFFICE O/P EST MOD 30 MIN: CPT | Mod: S$GLB,,, | Performed by: INTERNAL MEDICINE

## 2018-02-26 PROCEDURE — 99999 PR PBB SHADOW E&M-EST. PATIENT-LVL III: CPT | Mod: PBBFAC,,, | Performed by: INTERNAL MEDICINE

## 2018-02-26 RX ORDER — PAROXETINE 10 MG/1
10 TABLET, FILM COATED ORAL EVERY MORNING
Qty: 30 TABLET | Refills: 1 | Status: SHIPPED | OUTPATIENT
Start: 2018-02-26 | End: 2019-02-26

## 2018-02-26 NOTE — PROGRESS NOTES
Ms Marina is a 33y/o F here today for f/u on her anxiety.    She is currently on paroxitene 20mg. She states she is feeling better but she is very sleepy on this medication.     Assessment  1) Anxiety    Plan  1) Anxiety  -ask when pt is taking the medication (night vs morning)  -decrease Paroxitene dosage to 10mg   -f/u in 2-4 weeks

## 2018-02-26 NOTE — LETTER
February 26, 2018                 [facility]  Internal Medicine  [address] [place] [address]  Phone: [phone]  Fax: [phone]   February 26, 2018     Patient: Marsha Marina   YOB: 1985   Date of Visit: 2/26/2018       To Whom it May Concern:    Marsha Marina was seen in my clinic on 2/26/2018. She may return to work on 03/05/2018.    If you have any questions or concerns, please don't hesitate to call.    Sincerely,         Sonia Castorena, DO

## 2018-02-28 ENCOUNTER — PATIENT MESSAGE (OUTPATIENT)
Dept: INTERNAL MEDICINE | Facility: CLINIC | Age: 33
End: 2018-02-28

## 2018-02-28 ENCOUNTER — LAB VISIT (OUTPATIENT)
Dept: LAB | Facility: HOSPITAL | Age: 33
End: 2018-02-28
Attending: INTERNAL MEDICINE
Payer: COMMERCIAL

## 2018-02-28 ENCOUNTER — OFFICE VISIT (OUTPATIENT)
Dept: OBSTETRICS AND GYNECOLOGY | Facility: CLINIC | Age: 33
End: 2018-02-28
Payer: COMMERCIAL

## 2018-02-28 ENCOUNTER — OFFICE VISIT (OUTPATIENT)
Dept: RHEUMATOLOGY | Facility: CLINIC | Age: 33
End: 2018-02-28
Payer: COMMERCIAL

## 2018-02-28 VITALS
HEIGHT: 62 IN | DIASTOLIC BLOOD PRESSURE: 79 MMHG | HEART RATE: 73 BPM | SYSTOLIC BLOOD PRESSURE: 120 MMHG | WEIGHT: 174.81 LBS | BODY MASS INDEX: 32.17 KG/M2

## 2018-02-28 VITALS
WEIGHT: 175.06 LBS | HEIGHT: 62 IN | DIASTOLIC BLOOD PRESSURE: 67 MMHG | SYSTOLIC BLOOD PRESSURE: 104 MMHG | BODY MASS INDEX: 32.22 KG/M2

## 2018-02-28 DIAGNOSIS — M32.9 SYSTEMIC LUPUS ERYTHEMATOSUS ARTHRITIS: ICD-10-CM

## 2018-02-28 DIAGNOSIS — M32.14 STAGE IV LUPUS NEPHRITIS (WHO): ICD-10-CM

## 2018-02-28 DIAGNOSIS — B37.31 CANDIDIASIS OF VULVA AND VAGINA: Primary | ICD-10-CM

## 2018-02-28 DIAGNOSIS — N76.0 ACUTE VAGINITIS: ICD-10-CM

## 2018-02-28 DIAGNOSIS — M32.9 SYSTEMIC LUPUS ERYTHEMATOSUS ARTHRITIS: Primary | ICD-10-CM

## 2018-02-28 DIAGNOSIS — Z79.899 LONG-TERM CURRENT USE OF HIGH RISK MEDICATION OTHER THAN ANTICOAGULANT: ICD-10-CM

## 2018-02-28 DIAGNOSIS — E66.9 OBESITY (BMI 30-39.9): ICD-10-CM

## 2018-02-28 LAB
ALBUMIN SERPL BCP-MCNC: 4.1 G/DL
ALP SERPL-CCNC: 50 U/L
ALT SERPL W/O P-5'-P-CCNC: 9 U/L
ANION GAP SERPL CALC-SCNC: 9 MMOL/L
AST SERPL-CCNC: 14 U/L
BASOPHILS # BLD AUTO: 0.02 K/UL
BASOPHILS NFR BLD: 0.3 %
BILIRUB SERPL-MCNC: 0.6 MG/DL
BUN SERPL-MCNC: 10 MG/DL
CALCIUM SERPL-MCNC: 9.7 MG/DL
CHLORIDE SERPL-SCNC: 107 MMOL/L
CO2 SERPL-SCNC: 26 MMOL/L
CREAT SERPL-MCNC: 0.9 MG/DL
DIFFERENTIAL METHOD: NORMAL
EOSINOPHIL # BLD AUTO: 0.2 K/UL
EOSINOPHIL NFR BLD: 3.9 %
ERYTHROCYTE [DISTWIDTH] IN BLOOD BY AUTOMATED COUNT: 13.7 %
ERYTHROCYTE [SEDIMENTATION RATE] IN BLOOD BY WESTERGREN METHOD: 10 MM/HR
EST. GFR  (AFRICAN AMERICAN): >60 ML/MIN/1.73 M^2
EST. GFR  (NON AFRICAN AMERICAN): >60 ML/MIN/1.73 M^2
GLUCOSE SERPL-MCNC: 64 MG/DL
HCT VFR BLD AUTO: 37.9 %
HGB BLD-MCNC: 12.4 G/DL
LYMPHOCYTES # BLD AUTO: 2.6 K/UL
LYMPHOCYTES NFR BLD: 41.7 %
MCH RBC QN AUTO: 30.2 PG
MCHC RBC AUTO-ENTMCNC: 32.7 G/DL
MCV RBC AUTO: 92 FL
MONOCYTES # BLD AUTO: 0.5 K/UL
MONOCYTES NFR BLD: 7.4 %
NEUTROPHILS # BLD AUTO: 2.9 K/UL
NEUTROPHILS NFR BLD: 46.7 %
PLATELET # BLD AUTO: 228 K/UL
PMV BLD AUTO: 10.7 FL
POTASSIUM SERPL-SCNC: 4.3 MMOL/L
PROT SERPL-MCNC: 7.4 G/DL
RBC # BLD AUTO: 4.11 M/UL
SODIUM SERPL-SCNC: 142 MMOL/L
WBC # BLD AUTO: 6.23 K/UL

## 2018-02-28 PROCEDURE — 86147 CARDIOLIPIN ANTIBODY EA IG: CPT

## 2018-02-28 PROCEDURE — 85651 RBC SED RATE NONAUTOMATED: CPT | Mod: PO

## 2018-02-28 PROCEDURE — 85025 COMPLETE CBC W/AUTO DIFF WBC: CPT | Mod: PO

## 2018-02-28 PROCEDURE — 99999 PR PBB SHADOW E&M-EST. PATIENT-LVL III: CPT | Mod: PBBFAC,,, | Performed by: ADVANCED PRACTICE MIDWIFE

## 2018-02-28 PROCEDURE — 80053 COMPREHEN METABOLIC PANEL: CPT | Mod: PO

## 2018-02-28 PROCEDURE — 86140 C-REACTIVE PROTEIN: CPT

## 2018-02-28 PROCEDURE — 99999 PR PBB SHADOW E&M-EST. PATIENT-LVL III: CPT | Mod: PBBFAC,,, | Performed by: INTERNAL MEDICINE

## 2018-02-28 PROCEDURE — 86160 COMPLEMENT ANTIGEN: CPT

## 2018-02-28 PROCEDURE — 85613 RUSSELL VIPER VENOM DILUTED: CPT

## 2018-02-28 PROCEDURE — 86146 BETA-2 GLYCOPROTEIN ANTIBODY: CPT

## 2018-02-28 PROCEDURE — 86160 COMPLEMENT ANTIGEN: CPT | Mod: 59

## 2018-02-28 PROCEDURE — 36415 COLL VENOUS BLD VENIPUNCTURE: CPT | Mod: PO

## 2018-02-28 PROCEDURE — 99213 OFFICE O/P EST LOW 20 MIN: CPT | Mod: S$GLB,,, | Performed by: ADVANCED PRACTICE MIDWIFE

## 2018-02-28 PROCEDURE — 86038 ANTINUCLEAR ANTIBODIES: CPT

## 2018-02-28 PROCEDURE — 99215 OFFICE O/P EST HI 40 MIN: CPT | Mod: S$GLB,,, | Performed by: INTERNAL MEDICINE

## 2018-02-28 RX ORDER — VALACYCLOVIR HYDROCHLORIDE 500 MG/1
500 TABLET, FILM COATED ORAL DAILY
Qty: 30 TABLET | Refills: 2 | Status: SHIPPED | OUTPATIENT
Start: 2018-02-28

## 2018-02-28 RX ORDER — FLUCONAZOLE 100 MG/1
100 TABLET ORAL DAILY
Qty: 3 TABLET | Refills: 0 | Status: SHIPPED | OUTPATIENT
Start: 2018-02-28 | End: 2018-03-03

## 2018-02-28 NOTE — PROGRESS NOTES
RHEUMATOLOGY CLINIC FOLLOW UP VISIT  Chief complaints:-  Follow-up for lupus.    HPI:-  Marsha Anabelle Serrano a 32 y.o. pleasant female comes in for a follow up visit with above chief complaints.  She has significant past medical history as reviewed below including hypertensive encephalopathy and lupus nephritis on CellCept and Plaquenil therapy.  She denies any joint pain today.  She has lost more weight since the weight reduction surgery.  She is very happy about the weight loss.  No skin rash, photosensitive or malar rash, sicca syndrome, Raynaud's phenomenon, mucocutaneous ulcers.  No infections since last visit.  Tolerating medications well without any problems.  She is planning to think about getting pregnant in the near future since she has lost weight and is feeling really well.    Review of Systems   Constitutional: Negative for chills and fever.   HENT: Negative for congestion and sore throat.    Eyes: Negative for blurred vision and redness.   Respiratory: Negative for cough and shortness of breath.    Cardiovascular: Negative for chest pain and leg swelling.   Gastrointestinal: Negative for abdominal pain.   Genitourinary: Negative for dysuria.   Musculoskeletal: Negative for back pain, falls, joint pain, myalgias and neck pain.   Skin: Negative for rash.   Neurological: Negative for headaches.   Endo/Heme/Allergies: Does not bruise/bleed easily.   Psychiatric/Behavioral: Negative for memory loss. The patient does not have insomnia.        Past Medical History:   Diagnosis Date    Abnormal Pap smear of vagina     10 years ago//had colpo with normal results//    Asthma     Disorder of kidney and ureter     Genital herpes     Hypertension     SLE (systemic lupus erythematosus)        Past Surgical History:   Procedure Laterality Date    CYST REMOVAL      on back    Gastric sleeve      RENAL BIOPSY  4/4/16    WISDOM TOOTH EXTRACTION       "    Social History   Substance Use Topics    Smoking status: Never Smoker    Smokeless tobacco: Never Used    Alcohol use No       Family History   Problem Relation Age of Onset    Asthma Mother     Hyperlipidemia Father     Asthma Sister     Rashes / Skin problems Sister     Hypertension Other     Stroke Other     Heart disease Other     Diabetes Other     Breast cancer Neg Hx     Colon cancer Neg Hx     Ovarian cancer Neg Hx        Review of patient's allergies indicates:   Allergen Reactions    Aspartame Anaphylaxis    Cardizem [diltiazem hcl]      Nausea     Codeine Nausea And Vomiting     Bad Headache    Morphine Nausea And Vomiting     Bad Headache           Physical examination:-    Vitals:    02/28/18 1601   BP: 120/79   Pulse: 73   Weight: 79.3 kg (174 lb 13.2 oz)   Height: 5' 2" (1.575 m)   PainSc: 0-No pain       Physical Exam   Constitutional: She is oriented to person, place, and time and well-developed, well-nourished, and in no distress. No distress.   HENT:   Head: Normocephalic.   Mouth/Throat: Oropharynx is clear and moist.   Eyes: Conjunctivae and EOM are normal. Pupils are equal, round, and reactive to light.   Neck: Normal range of motion. Neck supple.   Cardiovascular: Normal rate and intact distal pulses.    Pulmonary/Chest: Effort normal. No respiratory distress.   Abdominal: Soft. There is no tenderness.   Musculoskeletal:   No synovitis over small joints of hands or feet.  No effusion over large joints.   Neurological: She is alert and oriented to person, place, and time. No cranial nerve deficit.   Skin: Skin is warm. No rash noted. No erythema.   Psychiatric: Mood and affect normal.   Nursing note and vitals reviewed.      Medication List with Changes/Refills   Current Medications    AMLODIPINE (NORVASC) 5 MG TABLET    Take 2 tablets (10 mg total) by mouth once daily.    BUDESONIDE (PULMICORT) 0.5 MG/2 ML NEBULIZER SOLUTION        CALCIUM CITRATE ORAL    Take 1 tablet by " mouth once daily at 6am.    ERGOCALCIFEROL (ERGOCALCIFEROL) 50,000 UNIT CAP    Take 1 capsule (50,000 Units total) by mouth once a week.    FLUCONAZOLE (DIFLUCAN) 100 MG TABLET    Take 1 tablet (100 mg total) by mouth once daily.    FLUTICASONE (FLONASE) 50 MCG/ACTUATION NASAL SPRAY    2 sprays by Each Nare route once daily at 6am.    LABETALOL (NORMODYNE) 200 MG TABLET    Take 1 tablet (200 mg total) by mouth every 12 (twelve) hours.    LACTOBACILLUS ACIDOPHILUS (PROBIOTIC ORAL)    Take 1 tablet by mouth once daily at 6am.    MULTIVITAMIN CAPSULE    Take 1 capsule by mouth once daily.    MYCOPHENOLATE (CELLCEPT) 500 MG TAB    Take 500 mg by mouth 2 (two) times daily.    OXYCODONE-ACETAMINOPHEN (PERCOCET) 5-325 MG PER TABLET    Take 1 tablet by mouth every 4 (four) hours as needed for Pain.    PANTOPRAZOLE (PROTONIX) 40 MG TABLET    Take 40 mg by mouth once daily.     PAROXETINE (PAXIL) 10 MG TABLET    Take 1 tablet (10 mg total) by mouth every morning.    PROAIR HFA 90 MCG/ACTUATION INHALER    Inhale 2 puffs into the lungs as needed.    PROMETHAZINE (PHENERGAN) 12.5 MG TAB    Take 1 tablet (12.5 mg total) by mouth 2 (two) times daily as needed.    TRIAMCINOLONE ACETONIDE 0.1% (KENALOG) 0.1 % CREAM    Apply topically 2 (two) times daily.    VALACYCLOVIR (VALTREX) 500 MG TABLET    Take 1 tablet (500 mg total) by mouth once daily.   Discontinued Medications    DIAZEPAM (VALIUM) 5 MG TABLET    Take 1 tablet (5 mg total) by mouth every 6 (six) hours as needed for Anxiety.       Assessment/Plans:-  No problem-specific Assessment & Plan notes found for this encounter.    # Systemic lupus erythematosus arthritis  Stable on CellCept and Plaquenil.  No active synovitis on exam today.  Check activity markers.  - DEEP; Standing  - C3 complement; Standing  - C4 complement; Standing  - CBC auto differential; Standing  - Comprehensive metabolic panel; Standing  - Sedimentation rate, manual; Standing  - C-reactive protein;  Standing  - Urinalysis; Standing  - Protein / creatinine ratio, urine; Standing  - Cardiolipin antibody; Standing  - Beta-2 glycoprotein antibodies; Standing  - DRVVT; Standing    # Stage IV and V lupus nephritis (WHO)  Improving . On cellcept and plaquenil. Check serologies and other labs today before determining about the safety of holding cellcept in preparation for getting pregnant.   - DEEP; Standing  - C3 complement; Standing  - C4 complement; Standing  - CBC auto differential; Standing  - Comprehensive metabolic panel; Standing  - Sedimentation rate, manual; Standing  - C-reactive protein; Standing  - Urinalysis; Standing  - Protein / creatinine ratio, urine; Standing  - Cardiolipin antibody; Standing  - Beta-2 glycoprotein antibodies; Standing  - DRVVT; Standing    # Obesity (BMI 30-39.9)  Significant weight loss since bariatric surgery . Continue diet control.     # Long-term current use of Cellcept  CBC, CMP today. Hold if any infection. Teratogenic.      # Follow-up in about 3 months (around 5/28/2018).      Disclaimer: This note was prepared using voice recognition system and is likely to have sound alike errors and is not proof read.  Please call me with any questions.

## 2018-02-28 NOTE — PROGRESS NOTES
Subjective:       Patient ID: Marsha Marina is a 32 y.o. female.    Chief Complaint: Follow-up    Marsha Marina  32 y.o.  Black or  female    Patient presents with:  Follow-up    HPI: Presents to the clinic to follow up on anxiety, depression and insomnia.   She feels paroxetine is working well for anxiety and depression but making her too sleepy throughout the day. She has tried taking the medication at different times during the day.   Initially she was having insomnia but thinks that was due to the sertraline.   She is ready to return to work and plans to go back on March 5.   She also continues to attend counseling sessions.       PMH: Reviewed    MEDS: Reviewed med card    ALLERGIES: Reviewed allergy card        Review of Systems   Constitutional: Negative for activity change and unexpected weight change.   HENT: Positive for rhinorrhea. Negative for trouble swallowing.    Eyes: Negative for discharge and visual disturbance.   Respiratory: Negative for chest tightness and wheezing.    Cardiovascular: Negative for chest pain and palpitations.   Gastrointestinal: Negative for blood in stool, constipation, diarrhea and vomiting.   Endocrine: Negative for polydipsia and polyuria.   Genitourinary: Negative for difficulty urinating, dysuria, hematuria and menstrual problem.   Musculoskeletal: Negative for arthralgias, joint swelling and neck pain.   Neurological: Positive for headaches. Negative for weakness.   Psychiatric/Behavioral: Positive for dysphoric mood. Negative for confusion. The patient is nervous/anxious.        Objective:      Physical Exam   Constitutional: She is oriented to person, place, and time. She appears well-developed and well-nourished. No distress.   Eyes: No scleral icterus.   Neck: No tracheal deviation present.   Cardiovascular: Normal rate.    Pulmonary/Chest: Effort normal. No respiratory distress.   Neurological: She is alert and oriented to person,  place, and time.   Skin: Skin is warm and dry.   Psychiatric: She has a normal mood and affect. Her behavior is normal. Judgment and thought content normal.   Vitals reviewed.      Assessment:       1. Anxiety    2. Mild depression        Plan:       Marsha was seen today for follow-up.    Diagnoses and all orders for this visit:    Anxiety  -     Decrease paroxetine (PAXIL) 10 MG tablet; Take 1 tablet (10 mg total) by mouth every morning.    Mild depression  -     Decrease paroxetine (PAXIL) 10 MG tablet; Take 1 tablet (10 mg total) by mouth every morning.    Continue counseling.     Okay to return to work as planned.    F/U in 6 weeks.

## 2018-03-01 LAB
C3 SERPL-MCNC: 125 MG/DL
C4 SERPL-MCNC: 22 MG/DL
CRP SERPL-MCNC: 3.3 MG/L

## 2018-03-01 RX ORDER — ALBUTEROL SULFATE 90 UG/1
2 AEROSOL, METERED RESPIRATORY (INHALATION)
Qty: 18 G | Refills: 1 | Status: SHIPPED | OUTPATIENT
Start: 2018-03-01 | End: 2018-03-26 | Stop reason: SDUPTHER

## 2018-03-02 LAB
ANA SER QL IF: NORMAL
CARDIOLIPIN IGG SER IA-ACNC: <9.4 GPL
CARDIOLIPIN IGM SER IA-ACNC: <9.4 MPL
LA PPP-IMP: NEGATIVE

## 2018-03-03 ENCOUNTER — PATIENT MESSAGE (OUTPATIENT)
Dept: OTOLARYNGOLOGY | Facility: CLINIC | Age: 33
End: 2018-03-03

## 2018-03-03 LAB
B2 GLYCOPROT1 IGA SER QL: <9 SAU
B2 GLYCOPROT1 IGG SER QL: <9 SGU
B2 GLYCOPROT1 IGM SER QL: <9 SMU

## 2018-03-05 NOTE — TELEPHONE ENCOUNTER
Spoke with patient to offer evaluation with one of Dr. Roa's partners due to him being out of office for family emergency. Patient states that today is her first day back to work and would rather wait on a response from Dr. Roa. Will forward my chart message to Dr. Roa, patient is aware that he may not be able to get to any  messages today.

## 2018-03-07 ENCOUNTER — PATIENT MESSAGE (OUTPATIENT)
Dept: INTERNAL MEDICINE | Facility: CLINIC | Age: 33
End: 2018-03-07

## 2018-03-07 NOTE — TELEPHONE ENCOUNTER
Pt states paperwork for her leave that was completed was vague. Employer informed that her leave from 11/2017 to 03/2018 was needing more information about why she had to be out of work for this period of time. Pt states form should reflect that her depression was a global disability which kept her from working

## 2018-03-08 ENCOUNTER — TELEPHONE (OUTPATIENT)
Dept: RHEUMATOLOGY | Facility: CLINIC | Age: 33
End: 2018-03-08

## 2018-03-08 NOTE — TELEPHONE ENCOUNTER
----- Message from Freddy Maza MD sent at 3/8/2018  7:53 AM CST -----  Regarding: Notification of Unviewed Test Results  Contact: [phone]  All labs returned good. I do not have issues in holding cellcept and continuing plaquenil if  agrees before starting to plan for pregnancy.

## 2018-03-09 NOTE — TELEPHONE ENCOUNTER
Spoke with pt and advised her of below, pt verbalized understanding and states that at this current time she is not planning on trying to get pregnant and she is going to continue her cellcept. States that she saw the OBGYN and while reviewing over her meds she told her that it wouldn't be a possibility and she was concerned Advised patient that when she does decide to try for pregnancy she can talk with Dr. TOPETE and Dr. Sutherland and go from there. Pt verbalized understanding

## 2018-03-12 ENCOUNTER — PATIENT MESSAGE (OUTPATIENT)
Dept: OTOLARYNGOLOGY | Facility: CLINIC | Age: 33
End: 2018-03-12

## 2018-03-13 ENCOUNTER — TELEPHONE (OUTPATIENT)
Dept: PODIATRY | Facility: CLINIC | Age: 33
End: 2018-03-13

## 2018-03-13 NOTE — TELEPHONE ENCOUNTER
----- Message from Courtney Dougherty sent at 3/13/2018  9:01 AM CDT -----  Contact: pt  She's calling in regards to  Missed call pls call pt back at [phone] (home)

## 2018-03-13 NOTE — TELEPHONE ENCOUNTER
----- Message from Ana Maria Gamez sent at 3/13/2018  8:37 AM CDT -----  Contact: pt  Please call pt @ [phone], pt states she is having ankle issue again, need to know if she can start back wearing the boot.

## 2018-03-13 NOTE — TELEPHONE ENCOUNTER
Left message for patient to return call upon receiving voicemail.    Munira Edmonds MA  Office of Dr. Renetta Em DPM   Podiatry Department, [facility]

## 2018-03-13 NOTE — TELEPHONE ENCOUNTER
Returned pt's call, who stated she is experiencing pain in ankle similar to pain at last visit. Pt stated she has started back using CAM BOOT, icing, and ace bandage wrap until her scheduled appointment. Pt was informed that the boot and icing should help with pain, pt was placed on waitlist to be contacted for sooner appointment. Pt expressed understanding, call ended pleasantly.  Munira Edmonds MA  Office of Dr. Renetta Acuna, [place]   Podiatry Department, [facility]

## 2018-03-23 ENCOUNTER — OFFICE VISIT (OUTPATIENT)
Dept: INTERNAL MEDICINE | Facility: CLINIC | Age: 33
End: 2018-03-23
Payer: COMMERCIAL

## 2018-03-23 VITALS
WEIGHT: 178.13 LBS | DIASTOLIC BLOOD PRESSURE: 74 MMHG | RESPIRATION RATE: 20 BRPM | HEIGHT: 62 IN | SYSTOLIC BLOOD PRESSURE: 130 MMHG | BODY MASS INDEX: 32.78 KG/M2 | OXYGEN SATURATION: 99 % | TEMPERATURE: 98 F | HEART RATE: 73 BPM

## 2018-03-23 DIAGNOSIS — F41.9 ANXIETY: Primary | ICD-10-CM

## 2018-03-23 DIAGNOSIS — F32.A MILD DEPRESSION: ICD-10-CM

## 2018-03-23 PROCEDURE — 3075F SYST BP GE 130 - 139MM HG: CPT | Mod: CPTII,S$GLB,, | Performed by: INTERNAL MEDICINE

## 2018-03-23 PROCEDURE — 99213 OFFICE O/P EST LOW 20 MIN: CPT | Mod: S$GLB,,, | Performed by: INTERNAL MEDICINE

## 2018-03-23 PROCEDURE — 3078F DIAST BP <80 MM HG: CPT | Mod: CPTII,S$GLB,, | Performed by: INTERNAL MEDICINE

## 2018-03-23 PROCEDURE — 99999 PR PBB SHADOW E&M-EST. PATIENT-LVL III: CPT | Mod: PBBFAC,,, | Performed by: INTERNAL MEDICINE

## 2018-03-26 ENCOUNTER — TELEPHONE (OUTPATIENT)
Dept: INTERNAL MEDICINE | Facility: CLINIC | Age: 33
End: 2018-03-26

## 2018-03-26 RX ORDER — ALBUTEROL SULFATE 90 UG/1
2 AEROSOL, METERED RESPIRATORY (INHALATION) EVERY 6 HOURS PRN
Qty: 18 G | Refills: 1 | Status: SHIPPED | OUTPATIENT
Start: 2018-03-26

## 2018-03-26 NOTE — PROGRESS NOTES
Marsha Marina  32 y.o.  Black or  female    Chief Complaint   Patient presents with    Follow-up       HPI:  Presents to the clinic to follow up on anxiety and depression. She is doing well on paroxetine. She denies medication side effects. Her sleep pattern is better. She is still seeing her counselor.   Since her last visit she has resigned from her job.   She states the [facility] paperwork was not accepted.  She states it needs to be re-submitted with more detailed information.     PMH: Reviewed    MEDS: Reviewed med card    ALLERGIES: Reviewed allergy card    PE: Reviewed vitals  GENERAL: Alert and oriented, no acute distress  HEART: Regular rate  LUNGS: Unlabored respirations  PSYCH: Mood and affect appropriate      ASSESSMENT/PLAN:    Marsha was seen today for follow-up.    Diagnoses and all orders for this visit:    Anxiety  -     Continue current management    Mild depression  -     Continue current management    Will submit letter to her former employer to appeal [facility] denial. Will obtain notes from her counselor first. Patient advised.     RTC: 2 months and as needed

## 2018-03-26 NOTE — TELEPHONE ENCOUNTER
----- Message from Page Luna sent at 3/23/2018  4:24 PM CDT -----  Contact: Denice/Pharmacy  Pharmacy needs to get the frequency of the Pro air, Please call her at [phone].    [facility] [address] - [address] [address]  Phone: [phone] Fax: [phone]    Thanks  td

## 2018-04-05 ENCOUNTER — PATIENT MESSAGE (OUTPATIENT)
Dept: INTERNAL MEDICINE | Facility: CLINIC | Age: 33
End: 2018-04-05

## 2018-04-26 ENCOUNTER — PATIENT MESSAGE (OUTPATIENT)
Dept: INTERNAL MEDICINE | Facility: CLINIC | Age: 33
End: 2018-04-26

## 2018-04-26 NOTE — LETTER
April 26, 2018    Marsha Marina  [address]   [address]  [facility] [address]             [facility]  [address] [place] [address] Drive  [facility] [address]  Phone: [phone]  Fax: [phone] To whom it may concern:     Marsha Marina is currently a patient under my care. She has been under my care since October 2015. I am very familiar with her. When she came to me in November 2017 in a significant amount of distress, I knew this was not her usual disposition. She reported being under a lot of stress due to her job. The effects were extending into her personal life and causing secondary symptoms that were not compatible with daily functioning. We decided she should take a medical leave of absence.     She had already been seeing a counselor who also agreed with a medical leave. This time was used to adjust medication and undergo counseling to ensure she would be able to return to work in a better position. Due to her other chronic conditions, the process is more complicated. She is at high risk for exacerbations and treatment failure due to having significant medical conditions. Furthermore, the medications she is taking places her at high risk for drug to drug interactions and requires close monitoring.     Given this situation, you must reconsider your decision. You must understand that as a responsible primary care physician I take cases such as this very seriously and make patient safety a top priority.     If you have any other questions or concerns, do not hesitate to contact my office.     Sincerely,       Sonia Castorena D.O.   Internal Medicine Physician

## 2018-05-29 ENCOUNTER — PATIENT MESSAGE (OUTPATIENT)
Dept: RHEUMATOLOGY | Facility: CLINIC | Age: 33
End: 2018-05-29

## 2018-05-29 ENCOUNTER — PATIENT MESSAGE (OUTPATIENT)
Dept: NEPHROLOGY | Facility: CLINIC | Age: 33
End: 2018-05-29

## 2018-05-29 DIAGNOSIS — M32.9 SYSTEMIC LUPUS ERYTHEMATOSUS ARTHRITIS: Primary | ICD-10-CM

## 2018-05-29 DIAGNOSIS — M32.14 STAGE IV LUPUS NEPHRITIS (WHO): ICD-10-CM

## 2018-07-30 ENCOUNTER — PATIENT MESSAGE (OUTPATIENT)
Dept: NEPHROLOGY | Facility: CLINIC | Age: 33
End: 2018-07-30

## 2018-07-30 DIAGNOSIS — I10 HTN (HYPERTENSION), BENIGN: ICD-10-CM

## 2018-07-30 RX ORDER — ERGOCALCIFEROL 1.25 MG/1
50000 CAPSULE ORAL WEEKLY
Qty: 12 CAPSULE | Refills: 3 | Status: SHIPPED | OUTPATIENT
Start: 2018-07-30

## 2018-07-30 RX ORDER — AMLODIPINE BESYLATE 5 MG/1
10 TABLET ORAL DAILY
Qty: 90 TABLET | Refills: 3 | Status: SHIPPED | OUTPATIENT
Start: 2018-07-30 | End: 2019-07-30

## 2018-07-30 RX ORDER — LABETALOL 200 MG/1
200 TABLET, FILM COATED ORAL EVERY 12 HOURS
Qty: 180 TABLET | Refills: 3 | Status: SHIPPED | OUTPATIENT
Start: 2018-07-30 | End: 2019-07-30

## 2019-01-16 ENCOUNTER — PATIENT MESSAGE (OUTPATIENT)
Dept: NEPHROLOGY | Facility: CLINIC | Age: 34
End: 2019-01-16

## 2019-01-16 ENCOUNTER — PATIENT MESSAGE (OUTPATIENT)
Dept: RHEUMATOLOGY | Facility: CLINIC | Age: 34
End: 2019-01-16

## 2019-01-18 DIAGNOSIS — A60.00 GENITAL HERPES: ICD-10-CM

## 2019-01-18 DIAGNOSIS — M32.9 SYSTEMIC LUPUS ERYTHEMATOSUS ARTHRITIS: ICD-10-CM

## 2019-01-18 DIAGNOSIS — M32.14 STAGE IV LUPUS NEPHRITIS (WHO): ICD-10-CM

## 2019-01-18 RX ORDER — HYDROXYCHLOROQUINE SULFATE 200 MG/1
TABLET, FILM COATED ORAL
Qty: 180 TABLET | Refills: 0 | Status: SHIPPED | OUTPATIENT
Start: 2019-01-18 | End: 2019-10-16 | Stop reason: SDUPTHER

## 2019-01-20 RX ORDER — VALACYCLOVIR HYDROCHLORIDE 1 G/1
TABLET, FILM COATED ORAL
Qty: 10 TABLET | Refills: 0 | Status: SHIPPED | OUTPATIENT
Start: 2019-01-20

## 2019-03-28 ENCOUNTER — PATIENT MESSAGE (OUTPATIENT)
Dept: RHEUMATOLOGY | Facility: CLINIC | Age: 34
End: 2019-03-28

## 2019-04-10 ENCOUNTER — PATIENT MESSAGE (OUTPATIENT)
Dept: RHEUMATOLOGY | Facility: CLINIC | Age: 34
End: 2019-04-10

## 2019-09-19 ENCOUNTER — PATIENT OUTREACH (OUTPATIENT)
Dept: ADMINISTRATIVE | Facility: HOSPITAL | Age: 34
End: 2019-09-19

## 2019-09-19 NOTE — PROGRESS NOTES
Call to schedule hypertension appointment. Patient relocated to [place]. Chart and care team updated.

## 2019-10-16 DIAGNOSIS — M32.9 SYSTEMIC LUPUS ERYTHEMATOSUS ARTHRITIS: ICD-10-CM

## 2019-10-16 DIAGNOSIS — M32.14 STAGE IV LUPUS NEPHRITIS (WHO): ICD-10-CM

## 2019-10-16 RX ORDER — HYDROXYCHLOROQUINE SULFATE 200 MG/1
TABLET, FILM COATED ORAL
Qty: 180 TABLET | Refills: 0 | Status: SHIPPED | OUTPATIENT
Start: 2019-10-16

## 2020-01-29 ENCOUNTER — PATIENT OUTREACH (OUTPATIENT)
Dept: ADMINISTRATIVE | Facility: HOSPITAL | Age: 35
End: 2020-01-29

## 2020-01-29 NOTE — PROGRESS NOTES
Outdated labs and no pap found in [facility] (2016 labs). No updated records for [facility]. LVM Re pap information

## 2021-04-13 NOTE — PROGRESS NOTES
RHEUMATOLOGY CLINIC FOLLOW UP VISIT  Chief complaints:-  To follow up for lupus.     HPI:-  Marsha Serrano a 30 y.o. pleasant female comes in for a follow up visit with above chief complaints.  She has complex PMH including hypertensive encephalopathy and lupus nephritis. She is on 3 g of CellCept a day with Plaquenil.  She had arthritis in hands and feet which has improved since being on therapy. She does not have any skin rash, photosensitivity or Raynaud's phenomenon. She recently underwent gastric sleeve procedure for her obesity and is already losing weight. She had all her medications on hold for the surgery and is going to restart them today. She is wondering whether she could reduce the dose of cellcept and other HTN medications since her disease and blood pressure have been doing well even when not taking the medications for past month.     Review of Systems   Constitutional: Negative for chills, fever, malaise/fatigue and weight loss.   HENT: Negative for congestion, ear discharge, ear pain, hearing loss, nosebleeds and sore throat.    Eyes: Negative for blurred vision, double vision, photophobia, discharge and redness.   Respiratory: Positive for shortness of breath. Negative for cough, hemoptysis and sputum production.    Cardiovascular: Negative for chest pain, palpitations, orthopnea, claudication and leg swelling.   Gastrointestinal: Positive for abdominal pain and nausea. Negative for blood in stool, constipation, diarrhea, melena and vomiting.   Genitourinary: Negative for dysuria, frequency, hematuria and urgency.   Musculoskeletal: Positive for joint pain. Negative for back pain, falls, myalgias and neck pain.   Skin: Negative for itching and rash.   Neurological: Positive for tingling. Negative for dizziness, tremors, sensory change, speech change, focal weakness, seizures, loss of consciousness, weakness and headaches.    Endo/Heme/Allergies: Negative for environmental allergies. Bruises/bleeds easily.   Psychiatric/Behavioral: Negative for depression, hallucinations and memory loss. The patient has insomnia. The patient is not nervous/anxious.        Past Medical History   Diagnosis Date    Abnormal Pap smear of vagina      10 years ago//had colpo with normal results//    Asthma     Genital herpes     Hypertension     SLE (systemic lupus erythematosus)        Past Surgical History   Procedure Laterality Date    Cyst removal       on back    [place] tooth [place]      Renal biopsy  4/4/16        Social History   Substance Use Topics    Smoking status: Never Smoker    Smokeless tobacco: None    Alcohol use 0.0 oz/week     0 Standard drinks or equivalent per week      Comment: occasional       Family History   Problem Relation Age of Onset    Asthma Mother     Hyperlipidemia Father     Asthma Sister     Rashes / Skin problems Sister     Hypertension Other     Stroke Other     Heart disease Other     Diabetes Other     Breast cancer Neg Hx     Colon cancer Neg Hx     Ovarian cancer Neg Hx        Review of patient's allergies indicates:   Allergen Reactions    Aspartame Anaphylaxis    Codeine Nausea And Vomiting     Bad Headache    Morphine Nausea And Vomiting     Bad Headache     Physical Exam   Constitutional: She is oriented to person, place, and time and well-developed, well-nourished, and in no distress. No distress.   HENT:   Head: Normocephalic.   Mouth/Throat: Oropharynx is clear and moist.   Eyes: Conjunctivae and EOM are normal. Pupils are equal, round, and reactive to light. Right eye exhibits no discharge. Left eye exhibits no discharge. No scleral icterus.   Neck: Normal range of motion. Neck supple.   Cardiovascular: Normal rate and intact distal pulses.    Pulmonary/Chest: Effort normal. No respiratory distress.   Abdominal: Soft. There is no tenderness.   Musculoskeletal:   No synovitis over  [de-identified] : At this time, due to possible medial meniscus tear of the right knee, the patient was advised to ice it.  She will have a telephonics visit in one week.  MCP's, PIP's or DIP's.   Bilateral wrists nontender.  No effusion. No sclerodactyly or telangiectasias. No malar rash. Normal nail fold capillaries.    Lymphadenopathy:     She has no cervical adenopathy.   Neurological: She is alert and oriented to person, place, and time. No cranial nerve deficit.   Skin: Skin is warm. No rash noted. She is not diaphoretic. No erythema.   Psychiatric: Mood, affect and judgment normal.   Nursing note and vitals reviewed.      Labs:-  Results for DICKSON ROMEO (MRN [id number]) as of 2/9/2017 16:50   Ref. Range 2/8/2017 13:45   Specimen UA Unknown Urine, Clean Catch   Color, UA Latest Ref Range: Yellow, Straw, Delaney  Yellow   pH, UA Latest Ref Range: 5.0 - 8.0  6.0   Specific Gravity, UA Latest Ref Range: 1.005 - 1.030  >=1.030 (A)   Appearance, UA Latest Ref Range: Clear  Clear   Protein, UA Latest Ref Range: Negative  2+ (A)   Glucose, UA Latest Ref Range: Negative  Negative   Ketones, UA Latest Ref Range: Negative  Trace (A)   Occult Blood UA Latest Ref Range: Negative  2+ (A)   Nitrite, UA Latest Ref Range: Negative  Negative   Bilirubin (UA) Latest Ref Range: Negative  Negative   Leukocytes, UA Latest Ref Range: Negative  Negative   RBC, UA Latest Ref Range: 0 - 4 /hpf 15 (H)   WBC, UA Latest Ref Range: 0 - 5 /hpf 4   Bacteria, UA Latest Ref Range: None-Occ /hpf Rare   Squam Epithel, UA Latest Units: /hpf 10   Hyaline Casts, UA Latest Ref Range: 0-1/lpf /lpf none   Microscopic Comment Unknown SEE COMMENT   Prot/Creat Ratio, Ur Latest Ref Range: 0.00 - 0.20  0.22 (H)   Protein, Urine Random Latest Ref Range: 0 - 15 mg/dL 54 (H)       Medication List with Changes/Refills   Current Medications    DILTIAZEM (CARDIZEM CD) 360 MG 24 HR CAPSULE    Take 1 capsule (360 mg total) by mouth once daily.    EFINACONAZOLE 10 % TONYA    Apply 1 application topically once daily.    ERGOCALCIFEROL (ERGOCALCIFEROL) 50,000 UNIT CAP    Take 1 capsule (50,000 Units total) by mouth every 7 days.     FUROSEMIDE (LASIX) 40 MG TABLET    Take 1 tablet (40 mg total) by mouth 2 (two) times daily.    HYDRALAZINE (APRESOLINE) 50 MG TABLET    Take 1 tablet (50 mg total) by mouth every 12 (twelve) hours.    HYDROXYCHLOROQUINE (PLAQUENIL) 200 MG TABLET    Take 1 tablet (200 mg total) by mouth 2 (two) times daily.    LABETALOL (NORMODYNE) 200 MG TABLET    Take 1 tablet (200 mg total) by mouth every 12 (twelve) hours.    MELATONIN 5 MG TAB    Take by mouth.    MYCOPHENOLATE (CELLCEPT) 500 MG TAB    Take 2 tablets (1,000 mg total) by mouth 3 (three) times daily.    OLIVE LEAF EXTRACT ORAL    Take by mouth.    PANTOPRAZOLE (PROTONIX) 40 MG TABLET    Take 40 mg by mouth once daily.     PROAIR HFA 90 MCG/ACTUATION INHALER    Inhale 2 puffs into the lungs as needed.    PROMETHAZINE (PHENERGAN) 12.5 MG TAB    Take 1 tablet (12.5 mg total) by mouth 2 (two) times daily as needed.    VALACYCLOVIR (VALTREX) 500 MG TABLET    Take 1 tablet (500 mg total) by mouth once daily.       Assessment/Plans:-  # Systemic lupus erythematosus arthritis  Stable with no synovitis. Continue cellcept and plaquenil.     # Stage IV and V lupus nephritis (WHO)  Stable lupus nephritis with improving Urine P/C ratio, normal serum creatinine under treatment by Nehrologist . Still has hematuria and proteinuria. She was requesting to reduce the cellcept since she has nausea already from her gastric bypass and is afraid that it might get worse by taking 1000 mg three times daily. Based on her improvement of systemic symptoms , I advised that the dose reduction should be decided by  and I would agree with 1000 mg bid as long as he is ok with it. Discuss with Dr. Sutherland on the dose.     Morbid obesity with BMI of 40.0-44.9, adult  Underwent gastric sleeve and have already lost 5 pounds.      # Return in about 3 months (around 5/9/2017).    Disclaimer: This note was prepared using voice recognition system and is likely to have sound  alike errors and is not proof read.  Please call me with any questions.